# Patient Record
Sex: MALE | Race: WHITE | NOT HISPANIC OR LATINO | URBAN - METROPOLITAN AREA
[De-identification: names, ages, dates, MRNs, and addresses within clinical notes are randomized per-mention and may not be internally consistent; named-entity substitution may affect disease eponyms.]

---

## 2024-07-15 ENCOUNTER — HOSPITAL ENCOUNTER (EMERGENCY)
Facility: HOSPITAL | Age: 34
Discharge: LEFT BEFORE DISCHARGE | End: 2024-07-15

## 2024-07-15 VITALS
TEMPERATURE: 100 F | RESPIRATION RATE: 18 BRPM | WEIGHT: 250 LBS | OXYGEN SATURATION: 96 % | SYSTOLIC BLOOD PRESSURE: 151 MMHG | BODY MASS INDEX: 33.86 KG/M2 | HEART RATE: 103 BPM | DIASTOLIC BLOOD PRESSURE: 90 MMHG | HEIGHT: 72 IN

## 2024-07-15 PROCEDURE — 99999 HC NO LEVEL OF SERVICE - ED ONLY: CPT

## 2024-07-15 RX ORDER — KETOROLAC TROMETHAMINE 30 MG/ML
15 INJECTION, SOLUTION INTRAMUSCULAR; INTRAVENOUS
Status: DISCONTINUED | OUTPATIENT
Start: 2024-07-15 | End: 2024-07-15 | Stop reason: HOSPADM

## 2024-07-15 RX ORDER — ONDANSETRON HYDROCHLORIDE 2 MG/ML
4 INJECTION, SOLUTION INTRAVENOUS
Status: DISCONTINUED | OUTPATIENT
Start: 2024-07-15 | End: 2024-07-15 | Stop reason: HOSPADM

## 2024-07-16 NOTE — FIRST PROVIDER EVALUATION
Medical screening examination initiated.  I have conducted a focused provider triage encounter, findings are as follows:    Brief history of present illness:  33 y/o m with hx of kidney stones presenting with right flank pain that worsened this am. It has improved and increased. +nausea. +blood in urine. Feels like previous kidney stones.     +tobacco use    PMHX: Kidney stones  PSgHX: appy  SOCHx: +tobacco use, previous etoh abuse      Vitals:    07/15/24 1921   BP: (!) 151/90   Pulse: 103   Resp: 18   Temp: 99.5 °F (37.5 °C)   SpO2: 96%   Weight: 113.4 kg (250 lb)   Height: 6' (1.829 m)       Pertinent physical exam:  non-toxic    Brief workup plan:  flank pain, labs, UA, toradol and zofran.     Preliminary workup initiated; this workup will be continued and followed by the physician or advanced practice provider that is assigned to the patient when roomed.    Froylan Morris DO, FAAEM  Emergency Staff Physician   Dept of Emergency Medicine   [facility]  Spectralink: [phone]        Disclaimer: This note has been generated using voice-recognition software. There may be typographical errors that have been missed during proof-reading.

## 2024-10-14 ENCOUNTER — HOSPITAL ENCOUNTER (EMERGENCY)
Facility: HOSPITAL | Age: 34
Discharge: HOME | End: 2024-10-14
Attending: EMERGENCY MEDICINE

## 2024-10-14 VITALS
SYSTOLIC BLOOD PRESSURE: 127 MMHG | RESPIRATION RATE: 18 BRPM | TEMPERATURE: 97 F | HEIGHT: 72 IN | OXYGEN SATURATION: 98 % | DIASTOLIC BLOOD PRESSURE: 70 MMHG | HEART RATE: 76 BPM | WEIGHT: 250 LBS | BODY MASS INDEX: 33.86 KG/M2

## 2024-10-14 DIAGNOSIS — N20.0 RIGHT NEPHROLITHIASIS: ICD-10-CM

## 2024-10-14 DIAGNOSIS — N20.0 NEPHROLITHIASIS: Primary | ICD-10-CM

## 2024-10-14 LAB
ALBUMIN SERPL BCP-MCNC: 3.9 G/DL (ref 3.5–5.2)
ALP SERPL-CCNC: 72 U/L (ref 55–135)
ALT SERPL W/O P-5'-P-CCNC: 33 U/L (ref 10–44)
ANION GAP SERPL CALC-SCNC: 10 MMOL/L (ref 8–16)
AST SERPL-CCNC: 20 U/L (ref 10–40)
BACTERIA #/AREA URNS HPF: ABNORMAL /HPF
BASOPHILS # BLD AUTO: 0.06 K/UL (ref 0–0.2)
BASOPHILS NFR BLD: 0.5 % (ref 0–1.9)
BILIRUB SERPL-MCNC: 0.3 MG/DL (ref 0.1–1)
BILIRUB UR QL STRIP: NEGATIVE
BUN SERPL-MCNC: 13 MG/DL (ref 6–20)
CALCIUM SERPL-MCNC: 9.1 MG/DL (ref 8.7–10.5)
CHLORIDE SERPL-SCNC: 105 MMOL/L (ref 95–110)
CLARITY UR: CLEAR
CO2 SERPL-SCNC: 22 MMOL/L (ref 23–29)
COLOR UR: YELLOW
CREAT SERPL-MCNC: 0.9 MG/DL (ref 0.5–1.4)
DIFFERENTIAL METHOD BLD: ABNORMAL
EOSINOPHIL # BLD AUTO: 0.1 K/UL (ref 0–0.5)
EOSINOPHIL NFR BLD: 0.6 % (ref 0–8)
ERYTHROCYTE [DISTWIDTH] IN BLOOD BY AUTOMATED COUNT: 12.8 % (ref 11.5–14.5)
EST. GFR  (NO RACE VARIABLE): >60 ML/MIN/1.73 M^2
GLUCOSE SERPL-MCNC: 154 MG/DL (ref 70–110)
GLUCOSE UR QL STRIP: NEGATIVE
HCT VFR BLD AUTO: 44.7 % (ref 40–54)
HGB BLD-MCNC: 15.3 G/DL (ref 14–18)
HGB UR QL STRIP: ABNORMAL
HYALINE CASTS #/AREA URNS LPF: 0 /LPF
IMM GRANULOCYTES # BLD AUTO: 0.08 K/UL (ref 0–0.04)
IMM GRANULOCYTES NFR BLD AUTO: 0.6 % (ref 0–0.5)
KETONES UR QL STRIP: NEGATIVE
LEUKOCYTE ESTERASE UR QL STRIP: ABNORMAL
LIPASE SERPL-CCNC: 14 U/L (ref 4–60)
LYMPHOCYTES # BLD AUTO: 1.2 K/UL (ref 1–4.8)
LYMPHOCYTES NFR BLD: 9.7 % (ref 18–48)
MCH RBC QN AUTO: 29.5 PG (ref 27–31)
MCHC RBC AUTO-ENTMCNC: 34.2 G/DL (ref 32–36)
MCV RBC AUTO: 86 FL (ref 82–98)
MICROSCOPIC COMMENT: ABNORMAL
MONOCYTES # BLD AUTO: 0.6 K/UL (ref 0.3–1)
MONOCYTES NFR BLD: 4.5 % (ref 4–15)
NEUTROPHILS # BLD AUTO: 10.6 K/UL (ref 1.8–7.7)
NEUTROPHILS NFR BLD: 84.1 % (ref 38–73)
NITRITE UR QL STRIP: NEGATIVE
NRBC BLD-RTO: 0 /100 WBC
PH UR STRIP: 6 [PH] (ref 5–8)
PLATELET # BLD AUTO: 237 K/UL (ref 150–450)
PMV BLD AUTO: 10.3 FL (ref 9.2–12.9)
POTASSIUM SERPL-SCNC: 4.1 MMOL/L (ref 3.5–5.1)
PROT SERPL-MCNC: 7 G/DL (ref 6–8.4)
PROT UR QL STRIP: ABNORMAL
RBC # BLD AUTO: 5.18 M/UL (ref 4.6–6.2)
RBC #/AREA URNS HPF: 76 /HPF (ref 0–4)
SODIUM SERPL-SCNC: 137 MMOL/L (ref 136–145)
SP GR UR STRIP: 1.02 (ref 1–1.03)
SQUAMOUS #/AREA URNS HPF: 0 /HPF
URN SPEC COLLECT METH UR: ABNORMAL
UROBILINOGEN UR STRIP-ACNC: NEGATIVE EU/DL
WBC # BLD AUTO: 12.64 K/UL (ref 3.9–12.7)
WBC #/AREA URNS HPF: 19 /HPF (ref 0–5)

## 2024-10-14 PROCEDURE — 99285 EMERGENCY DEPT VISIT HI MDM: CPT | Mod: 25

## 2024-10-14 PROCEDURE — 96375 TX/PRO/DX INJ NEW DRUG ADDON: CPT

## 2024-10-14 PROCEDURE — 80053 COMPREHEN METABOLIC PANEL: CPT

## 2024-10-14 PROCEDURE — 85025 COMPLETE CBC W/AUTO DIFF WBC: CPT

## 2024-10-14 PROCEDURE — 63600175 PHARM REV CODE 636 W HCPCS

## 2024-10-14 PROCEDURE — 96374 THER/PROPH/DIAG INJ IV PUSH: CPT

## 2024-10-14 PROCEDURE — 87086 URINE CULTURE/COLONY COUNT: CPT

## 2024-10-14 PROCEDURE — 83690 ASSAY OF LIPASE: CPT

## 2024-10-14 PROCEDURE — 81000 URINALYSIS NONAUTO W/SCOPE: CPT

## 2024-10-14 RX ORDER — ONDANSETRON 4 MG/1
4 TABLET, ORALLY DISINTEGRATING ORAL 2 TIMES DAILY
Qty: 8 TABLET | Refills: 0 | Status: SHIPPED | OUTPATIENT
Start: 2024-10-14 | End: 2024-10-18

## 2024-10-14 RX ORDER — KETOROLAC TROMETHAMINE 30 MG/ML
15 INJECTION, SOLUTION INTRAMUSCULAR; INTRAVENOUS
Status: COMPLETED | OUTPATIENT
Start: 2024-10-14 | End: 2024-10-14

## 2024-10-14 RX ORDER — TAMSULOSIN HYDROCHLORIDE 0.4 MG/1
0.4 CAPSULE ORAL DAILY
Qty: 5 CAPSULE | Refills: 0 | Status: SHIPPED | OUTPATIENT
Start: 2024-10-14 | End: 2024-10-19

## 2024-10-14 RX ORDER — OXYCODONE AND ACETAMINOPHEN 10; 325 MG/1; MG/1
1 TABLET ORAL EVERY 6 HOURS PRN
Qty: 12 TABLET | Refills: 0 | Status: SHIPPED | OUTPATIENT
Start: 2024-10-14 | End: 2024-10-17

## 2024-10-14 RX ORDER — ONDANSETRON HYDROCHLORIDE 2 MG/ML
4 INJECTION, SOLUTION INTRAVENOUS
Status: COMPLETED | OUTPATIENT
Start: 2024-10-14 | End: 2024-10-14

## 2024-10-14 RX ADMIN — KETOROLAC TROMETHAMINE 15 MG: 30 INJECTION, SOLUTION INTRAMUSCULAR; INTRAVENOUS at 09:10

## 2024-10-14 RX ADMIN — ONDANSETRON 4 MG: 2 INJECTION INTRAMUSCULAR; INTRAVENOUS at 09:10

## 2024-10-14 NOTE — DISCHARGE INSTRUCTIONS
Thank you for letting me care for you today - it was nice to meet you and I hope you feel better soon. You will have a procedure with Dr. Tyson on Thursday 10/17/24. They will call you tomorrow with details. Their number is [phone] if you don't hear from them.     I prescribed the following medication:   Flomax: 1 capsule once a day  Percocet: 1 tablet every 6 hours for pain  Zofran: You can take this up to 2 times per day, as needed for nausea     Please avoid ibuprofen/advil/aleve.      Our goal at [facility] is to always give you outstanding care and exceptional service. You may receive a survey by mail or email in the next week about your experience in our ED. We would greatly appreciate you completing and returning the survey. Your feedback provides us with a way to recognize our staff who give very good care and it helps us learn how to improve when your experience was below our aspiration of excellence.     All the best,     Lizet Duenas, MPH, PA-C  Emergency Department Physician Assistant  [facility], [facility]

## 2024-10-14 NOTE — ED PROVIDER NOTES
Encounter Date: 10/14/2024       History     Chief Complaint   Patient presents with    Flank Pain     Patient reports to the ED complaining of right sided flank pain that started last week, stopped, and came back today. Patient endorses nausea, vomiting. Denies urinary symptoms. Ambulatory to triage, appears mildly distressed due to pain.     Patient is a 34 y.o. male who presents to the ED for evaluation of right flank pain X 1 week. States that the pain comes in waves. Patient has a hx of kidney stones and states this feels similar.     Fever and chills are not reported.  Patient denies gross hematuria. There is accompanying nausea without vomiting. Denies dysuria, urinary frequency, urinary urgency, difficulty urinating. Has not had any medication today for pain control.     Denies chest pain, shortness of breath, wheezing, stridor, drooling, joint problems, rashes, or any other complaints at this time.     The history is provided by the patient.     Review of patient's allergies indicates:  No Known Allergies  No past medical history on file.  No past surgical history on file.  No family history on file.     Review of Systems   Constitutional:  Negative for fever.   Gastrointestinal:  Positive for nausea. Negative for abdominal distention, abdominal pain and vomiting.   Genitourinary:  Positive for flank pain. Negative for decreased urine volume, difficulty urinating, dysuria, frequency, hematuria, penile discharge, scrotal swelling, testicular pain and urgency.       Physical Exam     Initial Vitals [10/14/24 0844]   BP Pulse Resp Temp SpO2   128/74 72 16 97.4 °F (36.3 °C) 98 %      MAP       --         Physical Exam    Constitutional: He appears well-developed and well-nourished.   HENT:   Head: Normocephalic and atraumatic.   Cardiovascular:  Normal rate.           Abdominal: He exhibits no distension. There is no abdominal tenderness.   There is right CVA tenderness.  No left CVA tenderness. There is no  rebound and no guarding.     Neurological: He is alert.         ED Course   Procedures  Labs Reviewed   URINALYSIS, REFLEX TO URINE CULTURE - Abnormal       Result Value    Specimen UA Urine, Clean Catch      Color, UA Yellow      Appearance, UA Clear      pH, UA 6.0      Specific Gravity, UA 1.020      Protein, UA 1+ (*)     Glucose, UA Negative      Ketones, UA Negative      Bilirubin (UA) Negative      Occult Blood UA 3+ (*)     Nitrite, UA Negative      Urobilinogen, UA Negative      Leukocytes, UA Trace (*)     Narrative:     Specimen Source->Urine   CBC W/ AUTO DIFFERENTIAL - Abnormal    WBC 12.64      RBC 5.18      Hemoglobin 15.3      Hematocrit 44.7      MCV 86      MCH 29.5      MCHC 34.2      RDW 12.8      Platelets 237      MPV 10.3      Immature Granulocytes 0.6 (*)     Gran # (ANC) 10.6 (*)     Immature Grans (Abs) 0.08 (*)     Lymph # 1.2      Mono # 0.6      Eos # 0.1      Baso # 0.06      nRBC 0      Gran % 84.1 (*)     Lymph % 9.7 (*)     Mono % 4.5      Eosinophil % 0.6      Basophil % 0.5      Differential Method Automated     COMPREHENSIVE METABOLIC PANEL - Abnormal    Sodium 137      Potassium 4.1      Chloride 105      CO2 22 (*)     Glucose 154 (*)     BUN 13      Creatinine 0.9      Calcium 9.1      Total Protein 7.0      Albumin 3.9      Total Bilirubin 0.3      Alkaline Phosphatase 72      AST 20      ALT 33      eGFR >60      Anion Gap 10     URINALYSIS MICROSCOPIC - Abnormal    RBC, UA 76 (*)     WBC, UA 19 (*)     Bacteria None      Squam Epithel, UA 0      Hyaline Casts, UA 0      Microscopic Comment SEE COMMENT      Narrative:     Specimen Source->Urine   CULTURE, URINE   LIPASE    Lipase 14            Imaging Results              CT Renal Stone Study ABD Pelvis WO (Final result)  Result time 10/14/24 10:40:07      Final result by Chris Garcia DO (10/14/24 10:40:07)                   Impression:      1.1 cm calculus in the right renal pelvis, with surrounding fat stranding in  the right renal hilum, compatible with overlying inflammation or infection.  No hydronephrosis.  Recommend correlation with urinalysis.    Additional nonobstructing left-sided nephrolithiasis.    Several hypodensities in the liver which are indeterminate on this study.  Recommend further characterization with nonemergent MRI with and without contrast.      Electronically signed by: Chris Garcia  Date:    10/14/2024  Time:    10:40               Narrative:    EXAMINATION:  CT RENAL STONE STUDY ABD PELVIS WO    CLINICAL HISTORY:  Flank pain, kidney stone suspected;    TECHNIQUE:  Multiplanar images were obtained of the abdomen and pelvis from the hemidiaphragms through the symphysis pubis without intravenous contrast.    COMPARISON:  None    FINDINGS:  Lung Bases: Clear.    Heart: Heart size is normal.  No pericardial effusion.    Liver: There are 3 hypodensities in the liver which are indeterminate.  The largest hypodensity is in the central liver and measures approximately 6.0 by 4.9 cm (series 2, image 16).  Right hepatic lobe hypodensity measures 3.4 x 1.5 cm (series 2, image 35).  Left hepatic lobe hypodensity measures 1.7 cm (series 2, image 20).    Biliary tract: No intrahepatic or extrahepatic biliary ductal dilatation.    Gallbladder: No radiodense gallstone. No wall thickening or pericholecystic fluid.    Pancreas: Normal. No pancreatic ductal dilatation.    Spleen: Normal size without focal lesion.    Adrenals: Normal.    Kidneys and urinary collecting systems: There is a 1.1 cm calculus in the right renal pelvis with surrounding fat stranding and inflammation.  There is a punctate nonobstructing left-sided nephrolith.  There is no hydronephrosis.    Lymph nodes: None enlarged.    Stomach and bowel: The stomach is normal.  Loops of small and large bowel are normal in caliber without evidence for inflammation or obstruction.  The appendix is not definitively seen.    Peritoneum and mesentery: No ascites or  free intraperitoneal air. No abdominal fluid collection.    Vasculature: Normal.    Urinary bladder: Normal.    Reproductive organs: The prostate is unremarkable.    Body wall: No abnormality.    Musculoskeletal: No aggressive osseous lesion.                                       Medications   ketorolac injection 15 mg (15 mg Intravenous Given 10/14/24 0921)   ondansetron injection 4 mg (4 mg Intravenous Given 10/14/24 0921)     Medical Decision Making  Patient is an afebrile, well-appearing 34 y.o. male who here with right flank pain X 1 week. Exam with right CVA tenderness. Patient is tolerating PO. VSS.     Differential diagnosis for urinary symptoms includes but is not limited to cystitis, pyelonephritis, ureterolithiasis, nephrolithiasis, chlamydia, gonorrhea, vaginal candidiasis, bacterial vaginosis, Trichomonas    All historical, clinical, radiographic, findings were reviewed with the patient in detail. UA with blood, trace leukocytes, nitrite negative.  UC sent.  No leukocytosis or fever, vital signs are not suggestive of sepsis.  Kidney function WNL.  CT indicative of 1.1 cm calculus in the right renal pelvis.  No hydronephrosis.    I had a discussion with the on-call urologist who believes that patient can be seen as an outpatient on Thursday of this week to be stented, as long as patient's pain is under control and that is he tolerating PO.     Placed an urgent referral to Urology and gave patient the contact information for the urologist.  Patient has been counseled regarding the need for follow-up as well as the indication to return to the emergency room should new or worrisome developments occur.  The patient verbalized an understanding. The patient is asked if there are any questions or concerns. Patient given discharge instructions. We discuss the case, until all issues are addressed to the patient's satisfaction. Patient understands and is agreeable to the plan.     Amount and/or Complexity of Data  Reviewed  Labs: ordered. Decision-making details documented in ED Course.  Radiology: ordered and independent interpretation performed.    Risk  Prescription drug management.               ED Course as of 10/15/24 1853   Mon Oct 14, 2024   0850 Patient examined and assessed. Patient answering questions appropriately, speaking in complete sentences, respirations are even and unlabored.  AAO x 4.  [OB]   0930 WBC: 12.64  No leukocytosis [OB]   0948 Leukocyte Esterase, UA(!): Trace [OB]   0948 NITRITE UA: Negative [OB]   0948 Blood, UA(!): 3+ [OB]   0948 RBC, UA(!): 76 [OB]   0948 WBC, UA(!): 19 [OB]   1054 CT renal reviewed: Impression:     1.1 cm calculus in the right renal pelvis, with surrounding fat stranding in the right renal hilum, compatible with overlying inflammation or infection.  No hydronephrosis.  Recommend correlation with urinalysis.     Additional nonobstructing left-sided nephrolithiasis. [OB]   1101 Speaking with Urology (Dr. Tyson). Recommends either admission today or outpatient procedure on Thursday.  [OB]   1147 Speaking with Uro. Patient prefers procedure on Thursday. Requests KUB before discharge.     Recommends d/c with flomax, perocet. States that abx not needed.  [OB]   1249 Discussed that patient needs a KUB, however patient states that his ride is here and he can not wait any longer.  IV removed and patient left ER. [OB]      ED Course User Index  [OB] Lizet Duenas PA-C                           Clinical Impression:  Final diagnoses:  [N20.0] Right nephrolithiasis          ED Disposition Condition    Discharge           ED Prescriptions       Medication Sig Dispense Start Date End Date Auth. Provider    ondansetron (ZOFRAN-ODT) 4 MG TbDL Take 1 tablet (4 mg total) by mouth 2 (two) times daily. for 4 days 8 tablet 10/14/2024 10/18/2024 Lizet Duenas PA-C    oxyCODONE-acetaminophen (PERCOCET)  mg per tablet Take 1 tablet by mouth every 6 (six) hours as needed for Pain. 12 tablet  10/14/2024 10/17/2024 Lizet Duenas PA-C    tamsulosin (FLOMAX) 0.4 mg Cap Take 1 capsule (0.4 mg total) by mouth once daily. for 5 days 5 capsule 10/14/2024 10/19/2024 Lizet Duenas PA-C          Follow-up Information    None          Lizet Duenas PA-C  10/15/24 [id number]

## 2024-10-14 NOTE — Clinical Note
"Cesar Diazrosaura Macedo was seen and treated in our emergency department on 10/14/2024.  He may return to work on 10/21/2024.  Patient needs to have a procedure to remove a kidney stone on 10/17/24.      If you have any questions or concerns, please don't hesitate to call.      Lizet Duenas PA-C"

## 2024-10-15 LAB — BACTERIA UR CULT: NORMAL

## 2024-11-14 ENCOUNTER — HOSPITAL ENCOUNTER (EMERGENCY)
Facility: HOSPITAL | Age: 34
Discharge: PSYCHIATRIC OR SUBSTANCE USE FACILITY | End: 2024-11-15
Attending: INTERNAL MEDICINE
Payer: MEDICAID

## 2024-11-14 VITALS
HEIGHT: 72 IN | OXYGEN SATURATION: 100 % | SYSTOLIC BLOOD PRESSURE: 124 MMHG | BODY MASS INDEX: 33.86 KG/M2 | HEART RATE: 109 BPM | TEMPERATURE: 98 F | RESPIRATION RATE: 18 BRPM | WEIGHT: 250 LBS | DIASTOLIC BLOOD PRESSURE: 75 MMHG

## 2024-11-14 DIAGNOSIS — N30.01 ACUTE CYSTITIS WITH HEMATURIA: ICD-10-CM

## 2024-11-14 DIAGNOSIS — F23 ACUTE PSYCHOSIS: Primary | ICD-10-CM

## 2024-11-14 DIAGNOSIS — F19.10 SUBSTANCE ABUSE: ICD-10-CM

## 2024-11-14 LAB
ALBUMIN SERPL-MCNC: 4.7 G/DL (ref 3.5–5)
ALBUMIN/GLOB SERPL: 1.6 RATIO (ref 1.1–2)
ALP SERPL-CCNC: 81 UNIT/L (ref 40–150)
ALT SERPL-CCNC: 23 UNIT/L (ref 0–55)
AMPHET UR QL SCN: POSITIVE
ANION GAP SERPL CALC-SCNC: 10 MEQ/L
APAP SERPL-MCNC: <3 UG/ML (ref 10–30)
AST SERPL-CCNC: 22 UNIT/L (ref 5–34)
BACTERIA #/AREA URNS AUTO: ABNORMAL /HPF
BARBITURATE SCN PRESENT UR: NEGATIVE
BASOPHILS # BLD AUTO: 0.06 X10(3)/MCL
BASOPHILS NFR BLD AUTO: 0.6 %
BENZODIAZ UR QL SCN: NEGATIVE
BILIRUB SERPL-MCNC: 0.9 MG/DL
BILIRUB UR QL STRIP.AUTO: NEGATIVE
BUN SERPL-MCNC: 17.3 MG/DL (ref 8.9–20.6)
CALCIUM SERPL-MCNC: 9.8 MG/DL (ref 8.4–10.2)
CANNABINOIDS UR QL SCN: POSITIVE
CHLORIDE SERPL-SCNC: 108 MMOL/L (ref 98–107)
CLARITY UR: ABNORMAL
CO2 SERPL-SCNC: 22 MMOL/L (ref 22–29)
COCAINE UR QL SCN: NEGATIVE
COLOR UR AUTO: YELLOW
CREAT SERPL-MCNC: 1.01 MG/DL (ref 0.72–1.25)
CREAT/UREA NIT SERPL: 17
EOSINOPHIL # BLD AUTO: 0.08 X10(3)/MCL (ref 0–0.9)
EOSINOPHIL NFR BLD AUTO: 0.8 %
ERYTHROCYTE [DISTWIDTH] IN BLOOD BY AUTOMATED COUNT: 12.5 % (ref 11.5–17)
ETHANOL SERPL-MCNC: <10 MG/DL
FENTANYL UR QL SCN: NEGATIVE
GFR SERPLBLD CREATININE-BSD FMLA CKD-EPI: >60 ML/MIN/1.73/M2
GLOBULIN SER-MCNC: 3 GM/DL (ref 2.4–3.5)
GLUCOSE SERPL-MCNC: 112 MG/DL (ref 74–100)
GLUCOSE UR QL STRIP: NORMAL
HCT VFR BLD AUTO: 44.4 % (ref 42–52)
HGB BLD-MCNC: 15.6 G/DL (ref 14–18)
HGB UR QL STRIP: ABNORMAL
HYALINE CASTS #/AREA URNS LPF: ABNORMAL /LPF
IMM GRANULOCYTES # BLD AUTO: 0.03 X10(3)/MCL (ref 0–0.04)
IMM GRANULOCYTES NFR BLD AUTO: 0.3 %
KETONES UR QL STRIP: ABNORMAL
LEUKOCYTE ESTERASE UR QL STRIP: 75
LYMPHOCYTES # BLD AUTO: 2 X10(3)/MCL (ref 0.6–4.6)
LYMPHOCYTES NFR BLD AUTO: 20.2 %
MCH RBC QN AUTO: 29.8 PG (ref 27–31)
MCHC RBC AUTO-ENTMCNC: 35.1 G/DL (ref 33–36)
MCV RBC AUTO: 84.9 FL (ref 80–94)
MDMA UR QL SCN: NEGATIVE
MONOCYTES # BLD AUTO: 0.66 X10(3)/MCL (ref 0.1–1.3)
MONOCYTES NFR BLD AUTO: 6.7 %
MUCOUS THREADS URNS QL MICRO: ABNORMAL /LPF
NEUTROPHILS # BLD AUTO: 7.09 X10(3)/MCL (ref 2.1–9.2)
NEUTROPHILS NFR BLD AUTO: 71.4 %
NITRITE UR QL STRIP: NEGATIVE
NRBC BLD AUTO-RTO: 0 %
OPIATES UR QL SCN: NEGATIVE
PCP UR QL: NEGATIVE
PH UR STRIP: 6 [PH]
PH UR: 6 [PH] (ref 3–11)
PLATELET # BLD AUTO: 265 X10(3)/MCL (ref 130–400)
PMV BLD AUTO: 10.4 FL (ref 7.4–10.4)
POTASSIUM SERPL-SCNC: 3.7 MMOL/L (ref 3.5–5.1)
PROT SERPL-MCNC: 7.7 GM/DL (ref 6.4–8.3)
PROT UR QL STRIP: ABNORMAL
RBC # BLD AUTO: 5.23 X10(6)/MCL (ref 4.7–6.1)
RBC #/AREA URNS AUTO: >100 /HPF
SODIUM SERPL-SCNC: 140 MMOL/L (ref 136–145)
SP GR UR STRIP.AUTO: 1.04 (ref 1–1.03)
SPECIFIC GRAVITY, URINE AUTO (.000) (OHS): 1.04 (ref 1–1.03)
SPERM URNS QL MICRO: ABNORMAL /HPF
SQUAMOUS #/AREA URNS LPF: ABNORMAL /HPF
TSH SERPL-ACNC: 1.78 UIU/ML (ref 0.35–4.94)
UROBILINOGEN UR STRIP-ACNC: 2
WBC # BLD AUTO: 9.92 X10(3)/MCL (ref 4.5–11.5)
WBC #/AREA URNS AUTO: ABNORMAL /HPF

## 2024-11-14 PROCEDURE — 99285 EMERGENCY DEPT VISIT HI MDM: CPT

## 2024-11-14 PROCEDURE — 80143 DRUG ASSAY ACETAMINOPHEN: CPT | Performed by: INTERNAL MEDICINE

## 2024-11-14 PROCEDURE — 81001 URINALYSIS AUTO W/SCOPE: CPT | Mod: XB | Performed by: INTERNAL MEDICINE

## 2024-11-14 PROCEDURE — 85025 COMPLETE CBC W/AUTO DIFF WBC: CPT | Performed by: INTERNAL MEDICINE

## 2024-11-14 PROCEDURE — 80053 COMPREHEN METABOLIC PANEL: CPT | Performed by: INTERNAL MEDICINE

## 2024-11-14 PROCEDURE — 25000003 PHARM REV CODE 250: Performed by: INTERNAL MEDICINE

## 2024-11-14 PROCEDURE — 87086 URINE CULTURE/COLONY COUNT: CPT | Performed by: INTERNAL MEDICINE

## 2024-11-14 PROCEDURE — 84443 ASSAY THYROID STIM HORMONE: CPT | Performed by: INTERNAL MEDICINE

## 2024-11-14 PROCEDURE — 82077 ASSAY SPEC XCP UR&BREATH IA: CPT | Performed by: INTERNAL MEDICINE

## 2024-11-14 PROCEDURE — 80307 DRUG TEST PRSMV CHEM ANLYZR: CPT | Performed by: INTERNAL MEDICINE

## 2024-11-14 RX ORDER — LORAZEPAM 1 MG/1
2 TABLET ORAL
Status: COMPLETED | OUTPATIENT
Start: 2024-11-14 | End: 2024-11-14

## 2024-11-14 RX ORDER — AMLODIPINE BESYLATE 5 MG/1
5 TABLET ORAL
Status: COMPLETED | OUTPATIENT
Start: 2024-11-14 | End: 2024-11-14

## 2024-11-14 RX ORDER — CEPHALEXIN 500 MG/1
500 CAPSULE ORAL 4 TIMES DAILY
Qty: 20 CAPSULE | Refills: 0 | Status: ON HOLD | OUTPATIENT
Start: 2024-11-14 | End: 2024-11-21 | Stop reason: HOSPADM

## 2024-11-14 RX ORDER — CEPHALEXIN 500 MG/1
500 CAPSULE ORAL EVERY 8 HOURS
Status: DISCONTINUED | OUTPATIENT
Start: 2024-11-14 | End: 2024-11-15 | Stop reason: HOSPADM

## 2024-11-14 RX ADMIN — AMLODIPINE BESYLATE 5 MG: 5 TABLET ORAL at 08:11

## 2024-11-14 RX ADMIN — LORAZEPAM 2 MG: 1 TABLET ORAL at 06:11

## 2024-11-14 RX ADMIN — CEPHALEXIN 500 MG: 500 CAPSULE ORAL at 08:11

## 2024-11-15 ENCOUNTER — HOSPITAL ENCOUNTER (INPATIENT)
Facility: HOSPITAL | Age: 34
LOS: 8 days | Discharge: HOME | DRG: 885 | End: 2024-11-23
Attending: PSYCHIATRY & NEUROLOGY | Admitting: PSYCHIATRY & NEUROLOGY
Payer: MEDICAID

## 2024-11-15 DIAGNOSIS — F29 PSYCHOSIS: ICD-10-CM

## 2024-11-15 LAB
ALBUMIN SERPL-MCNC: 4.1 G/DL (ref 3.5–5)
ALBUMIN/GLOB SERPL: 1.5 RATIO (ref 1.1–2)
ALP SERPL-CCNC: 74 UNIT/L (ref 40–150)
ALT SERPL-CCNC: 21 UNIT/L (ref 0–55)
ANION GAP SERPL CALC-SCNC: 8 MEQ/L
AST SERPL-CCNC: 22 UNIT/L (ref 5–34)
BASOPHILS # BLD AUTO: 0.06 X10(3)/MCL
BASOPHILS NFR BLD AUTO: 0.7 %
BILIRUB SERPL-MCNC: 0.6 MG/DL
BUN SERPL-MCNC: 18.3 MG/DL (ref 8.9–20.6)
CALCIUM SERPL-MCNC: 9.3 MG/DL (ref 8.4–10.2)
CHLORIDE SERPL-SCNC: 105 MMOL/L (ref 98–107)
CHOLEST SERPL-MCNC: 143 MG/DL
CHOLEST/HDLC SERPL: 4 {RATIO} (ref 0–5)
CO2 SERPL-SCNC: 30 MMOL/L (ref 22–29)
CREAT SERPL-MCNC: 0.89 MG/DL (ref 0.72–1.25)
CREAT/UREA NIT SERPL: 21
EOSINOPHIL # BLD AUTO: 0.14 X10(3)/MCL (ref 0–0.9)
EOSINOPHIL NFR BLD AUTO: 1.5 %
ERYTHROCYTE [DISTWIDTH] IN BLOOD BY AUTOMATED COUNT: 12.4 % (ref 11.5–17)
EST. AVERAGE GLUCOSE BLD GHB EST-MCNC: 102.5 MG/DL
GFR SERPLBLD CREATININE-BSD FMLA CKD-EPI: >60 ML/MIN/1.73/M2
GLOBULIN SER-MCNC: 2.8 GM/DL (ref 2.4–3.5)
GLUCOSE SERPL-MCNC: 81 MG/DL (ref 74–100)
HBA1C MFR BLD: 5.2 %
HCT VFR BLD AUTO: 45.1 % (ref 42–52)
HDLC SERPL-MCNC: 40 MG/DL (ref 35–60)
HGB BLD-MCNC: 15.2 G/DL (ref 14–18)
IMM GRANULOCYTES # BLD AUTO: 0.03 X10(3)/MCL (ref 0–0.04)
IMM GRANULOCYTES NFR BLD AUTO: 0.3 %
LDLC SERPL CALC-MCNC: 82 MG/DL (ref 50–140)
LYMPHOCYTES # BLD AUTO: 2.61 X10(3)/MCL (ref 0.6–4.6)
LYMPHOCYTES NFR BLD AUTO: 28.5 %
MCH RBC QN AUTO: 29.7 PG (ref 27–31)
MCHC RBC AUTO-ENTMCNC: 33.7 G/DL (ref 33–36)
MCV RBC AUTO: 88.3 FL (ref 80–94)
MONOCYTES # BLD AUTO: 0.85 X10(3)/MCL (ref 0.1–1.3)
MONOCYTES NFR BLD AUTO: 9.3 %
NEUTROPHILS # BLD AUTO: 5.47 X10(3)/MCL (ref 2.1–9.2)
NEUTROPHILS NFR BLD AUTO: 59.7 %
NRBC BLD AUTO-RTO: 0 %
PLATELET # BLD AUTO: 246 X10(3)/MCL (ref 130–400)
PMV BLD AUTO: 10.8 FL (ref 7.4–10.4)
POTASSIUM SERPL-SCNC: 4.3 MMOL/L (ref 3.5–5.1)
PROT SERPL-MCNC: 6.9 GM/DL (ref 6.4–8.3)
RBC # BLD AUTO: 5.11 X10(6)/MCL (ref 4.7–6.1)
SODIUM SERPL-SCNC: 143 MMOL/L (ref 136–145)
T PALLIDUM AB SER QL: NONREACTIVE
TRIGL SERPL-MCNC: 103 MG/DL (ref 34–140)
TSH SERPL-ACNC: 2.18 UIU/ML (ref 0.35–4.94)
VLDLC SERPL CALC-MCNC: 21 MG/DL
WBC # BLD AUTO: 9.16 X10(3)/MCL (ref 4.5–11.5)

## 2024-11-15 PROCEDURE — 86780 TREPONEMA PALLIDUM: CPT | Performed by: PSYCHIATRY & NEUROLOGY

## 2024-11-15 PROCEDURE — 80053 COMPREHEN METABOLIC PANEL: CPT | Performed by: PSYCHIATRY & NEUROLOGY

## 2024-11-15 PROCEDURE — 25000003 PHARM REV CODE 250: Performed by: PSYCHIATRY & NEUROLOGY

## 2024-11-15 PROCEDURE — 85025 COMPLETE CBC W/AUTO DIFF WBC: CPT | Performed by: PSYCHIATRY & NEUROLOGY

## 2024-11-15 PROCEDURE — 25000003 PHARM REV CODE 250

## 2024-11-15 PROCEDURE — 36415 COLL VENOUS BLD VENIPUNCTURE: CPT | Performed by: PSYCHIATRY & NEUROLOGY

## 2024-11-15 PROCEDURE — 12400001 HC PSYCH SEMI-PRIVATE ROOM

## 2024-11-15 PROCEDURE — 84443 ASSAY THYROID STIM HORMONE: CPT | Performed by: PSYCHIATRY & NEUROLOGY

## 2024-11-15 PROCEDURE — 25000003 PHARM REV CODE 250: Performed by: PEDIATRICS

## 2024-11-15 PROCEDURE — 80061 LIPID PANEL: CPT | Performed by: PSYCHIATRY & NEUROLOGY

## 2024-11-15 PROCEDURE — S4991 NICOTINE PATCH NONLEGEND: HCPCS | Performed by: PSYCHIATRY & NEUROLOGY

## 2024-11-15 PROCEDURE — 83036 HEMOGLOBIN GLYCOSYLATED A1C: CPT | Performed by: PSYCHIATRY & NEUROLOGY

## 2024-11-15 RX ORDER — CEPHALEXIN 500 MG/1
500 CAPSULE ORAL 4 TIMES DAILY
Status: COMPLETED | OUTPATIENT
Start: 2024-11-15 | End: 2024-11-18

## 2024-11-15 RX ORDER — ACETAMINOPHEN 325 MG/1
650 TABLET ORAL EVERY 6 HOURS PRN
Status: DISCONTINUED | OUTPATIENT
Start: 2024-11-16 | End: 2024-11-23 | Stop reason: HOSPADM

## 2024-11-15 RX ORDER — SERTRALINE HYDROCHLORIDE 50 MG/1
50 TABLET, FILM COATED ORAL DAILY
Status: DISCONTINUED | OUTPATIENT
Start: 2024-11-15 | End: 2024-11-18

## 2024-11-15 RX ORDER — CLONIDINE HYDROCHLORIDE 0.1 MG/1
0.2 TABLET ORAL EVERY 8 HOURS PRN
Status: DISCONTINUED | OUTPATIENT
Start: 2024-11-15 | End: 2024-11-23 | Stop reason: HOSPADM

## 2024-11-15 RX ORDER — HALOPERIDOL 5 MG/ML
10 INJECTION INTRAMUSCULAR EVERY 4 HOURS PRN
Status: DISCONTINUED | OUTPATIENT
Start: 2024-11-16 | End: 2024-11-23 | Stop reason: HOSPADM

## 2024-11-15 RX ORDER — DIPHENHYDRAMINE HCL 50 MG
50 CAPSULE ORAL EVERY 4 HOURS PRN
Status: DISCONTINUED | OUTPATIENT
Start: 2024-11-16 | End: 2024-11-23 | Stop reason: HOSPADM

## 2024-11-15 RX ORDER — LORAZEPAM 0.5 MG/1
0.5 TABLET ORAL 3 TIMES DAILY
Status: COMPLETED | OUTPATIENT
Start: 2024-11-19 | End: 2024-11-20

## 2024-11-15 RX ORDER — DIPHENHYDRAMINE HYDROCHLORIDE 50 MG/ML
50 INJECTION INTRAMUSCULAR; INTRAVENOUS EVERY 4 HOURS PRN
Status: DISCONTINUED | OUTPATIENT
Start: 2024-11-16 | End: 2024-11-23 | Stop reason: HOSPADM

## 2024-11-15 RX ORDER — LORAZEPAM 2 MG/ML
2 INJECTION INTRAMUSCULAR EVERY 4 HOURS PRN
Status: DISCONTINUED | OUTPATIENT
Start: 2024-11-16 | End: 2024-11-23 | Stop reason: HOSPADM

## 2024-11-15 RX ORDER — IBUPROFEN 200 MG
1 TABLET ORAL DAILY
Status: DISCONTINUED | OUTPATIENT
Start: 2024-11-15 | End: 2024-11-23 | Stop reason: HOSPADM

## 2024-11-15 RX ORDER — LORAZEPAM 1 MG/1
2 TABLET ORAL EVERY 4 HOURS PRN
Status: DISCONTINUED | OUTPATIENT
Start: 2024-11-16 | End: 2024-11-23 | Stop reason: HOSPADM

## 2024-11-15 RX ORDER — ALUMINUM HYDROXIDE, MAGNESIUM HYDROXIDE, AND SIMETHICONE 1200; 120; 1200 MG/30ML; MG/30ML; MG/30ML
30 SUSPENSION ORAL EVERY 6 HOURS PRN
Status: DISCONTINUED | OUTPATIENT
Start: 2024-11-16 | End: 2024-11-23 | Stop reason: HOSPADM

## 2024-11-15 RX ORDER — TAMSULOSIN HYDROCHLORIDE 0.4 MG/1
0.4 CAPSULE ORAL DAILY
Status: DISCONTINUED | OUTPATIENT
Start: 2024-11-15 | End: 2024-11-23 | Stop reason: HOSPADM

## 2024-11-15 RX ORDER — LORAZEPAM 1 MG/1
1 TABLET ORAL 3 TIMES DAILY
Status: COMPLETED | OUTPATIENT
Start: 2024-11-17 | End: 2024-11-18

## 2024-11-15 RX ORDER — HALOPERIDOL 5 MG/1
10 TABLET ORAL EVERY 4 HOURS PRN
Status: DISCONTINUED | OUTPATIENT
Start: 2024-11-16 | End: 2024-11-23 | Stop reason: HOSPADM

## 2024-11-15 RX ORDER — HYDROXYZINE HYDROCHLORIDE 50 MG/1
50 TABLET, FILM COATED ORAL EVERY 4 HOURS PRN
Status: DISCONTINUED | OUTPATIENT
Start: 2024-11-15 | End: 2024-11-23 | Stop reason: HOSPADM

## 2024-11-15 RX ORDER — LORAZEPAM 1 MG/1
2 TABLET ORAL 3 TIMES DAILY
Status: DISPENSED | OUTPATIENT
Start: 2024-11-15 | End: 2024-11-17

## 2024-11-15 RX ORDER — CLONIDINE HYDROCHLORIDE 0.1 MG/1
0.1 TABLET ORAL EVERY 8 HOURS PRN
Status: DISCONTINUED | OUTPATIENT
Start: 2024-11-15 | End: 2024-11-23 | Stop reason: HOSPADM

## 2024-11-15 RX ORDER — TRAZODONE HYDROCHLORIDE 100 MG/1
100 TABLET ORAL NIGHTLY PRN
Status: DISCONTINUED | OUTPATIENT
Start: 2024-11-15 | End: 2024-11-23 | Stop reason: HOSPADM

## 2024-11-15 RX ADMIN — TAMSULOSIN HYDROCHLORIDE 0.4 MG: 0.4 CAPSULE ORAL at 01:11

## 2024-11-15 RX ADMIN — TRAZODONE HYDROCHLORIDE 100 MG: 100 TABLET ORAL at 08:11

## 2024-11-15 RX ADMIN — SERTRALINE HYDROCHLORIDE 50 MG: 50 TABLET ORAL at 10:11

## 2024-11-15 RX ADMIN — LORAZEPAM 2 MG: 1 TABLET ORAL at 10:11

## 2024-11-15 RX ADMIN — NICOTINE 1 PATCH: 21 PATCH, EXTENDED RELEASE TRANSDERMAL at 09:11

## 2024-11-15 RX ADMIN — CEPHALEXIN 500 MG: 500 CAPSULE ORAL at 08:11

## 2024-11-15 RX ADMIN — LORAZEPAM 2 MG: 1 TABLET ORAL at 08:11

## 2024-11-15 RX ADMIN — HYDROXYZINE HYDROCHLORIDE 50 MG: 50 TABLET, FILM COATED ORAL at 08:11

## 2024-11-15 RX ADMIN — CEPHALEXIN 500 MG: 500 CAPSULE ORAL at 04:11

## 2024-11-15 RX ADMIN — CEPHALEXIN 500 MG: 500 CAPSULE ORAL at 01:11

## 2024-11-15 NOTE — PLAN OF CARE
Problem: Adult Behavioral Health Plan of Care  Goal: Plan of Care Review  Outcome: Not Progressing  Flowsheets (Taken 11/15/2024 0922)  Patient Agreement with Plan of Care: agrees  Plan of Care Reviewed With: patient  Goal: Patient-Specific Goal (Individualization)  Outcome: Not Progressing  Flowsheets (Taken 11/15/2024 0922)  Patient Personal Strengths: independent living skills  Patient Vulnerabilities: substance abuse/addiction  Goal: Adheres to Safety Considerations for Self and Others  Outcome: Not Progressing  Flowsheets (Taken 11/15/2024 0922)  Adheres to Safety Considerations for Self and Others: unable to achieve outcome  Intervention: Develop and Maintain Individualized Safety Plan  Flowsheets (Taken 11/15/2024 0922)  Safety Measures: safety rounds completed  Goal: Absence of New-Onset Illness or Injury  Outcome: Not Progressing  Intervention: Identify and Manage Fall Risk  Flowsheets (Taken 11/15/2024 0922)  Safety Measures: safety rounds completed  Intervention: Prevent VTE (Venous Thromboembolism)  Flowsheets (Taken 11/15/2024 0922)  VTE Prevention/Management: fluids promoted  Intervention: Prevent Infection  Flowsheets (Taken 11/15/2024 0922)  Infection Prevention: hand hygiene promoted  Goal: Optimized Coping Skills in Response to Life Stressors  Outcome: Not Progressing  Flowsheets (Taken 11/15/2024 0922)  Optimized Coping Skills in Response to Life Stressors: unable to achieve outcome  Intervention: Promote Effective Coping Strategies  Flowsheets (Taken 11/15/2024 0922)  Supportive Measures: self-care encouraged  Goal: Develops/Participates in Therapeutic [place] to Support Successful Transition  Outcome: Not Progressing  Flowsheets (Taken 11/15/2024 0922)  Develops/Participates in Therapeutic [place] to Support Successful Transition: unable to achieve outcome  Intervention: Foster Therapeutic [place]  Flowsheets (Taken 11/15/2024 0922)  Trust Relationship/Rapport: care explained  Goal:  Rounds/Family Conference  Outcome: Not Progressing  Flowsheets (Taken 11/15/2024 0922)  Participants:   milieu/psych techs   nursing     Problem: Psychotic Signs/Symptoms  Goal: Improved Behavioral Control (Psychotic Signs/Symptoms)  Outcome: Not Progressing  Flowsheets (Taken 11/15/2024 0922)  Mutually Determined Action Steps (Improved Behavioral Control): identifies symptoms triggers  Intervention: Manage Behavior  Flowsheets (Taken 11/15/2024 0922)  De-Escalation Techniques: quiet time facilitated  Goal: Optimal Cognitive Function (Psychotic Signs/Symptoms)  Outcome: Not Progressing  Flowsheets (Taken 11/15/2024 0922)  Mutually Determined Action Steps (Optimal Cognitive Function): participates in attention training  Intervention: Support and Promote Cognitive Ability  Flowsheets (Taken 11/15/2024 0922)  Trust Relationship/Rapport: care explained  Goal: Increased Participation and Engagement (Psychotic Signs/Symptoms)  Outcome: Not Progressing  Flowsheets (Taken 11/15/2024 0922)  Mutually Determined Action Steps (Increased Participation and Engagement): identifies symptoms triggers  Intervention: Facilitate Participation and Engagement  Flowsheets (Taken 11/15/2024 0922)  Supportive Measures: self-care encouraged  Diversional Activity: television  Goal: Improved Mood Symptoms (Psychotic Signs/Symptoms)  Outcome: Not Progressing  Flowsheets (Taken 11/15/2024 0922)  Mutually Determined Action Steps (Improved Mood Symptoms): acknowledges progress  Intervention: Optimize Emotion and Mood  Flowsheets (Taken 11/15/2024 0922)  Supportive Measures: self-care encouraged  Diversional Activity: television  Goal: Improved Psychomotor Symptoms (Psychotic Signs/Symptoms)  Outcome: Not Progressing  Flowsheets (Taken 11/15/2024 0922)  Mutually Determined Action Steps (Improved Psychomotor Symptoms): exhibits decrease in agitation  Intervention: Manage Psychomotor Movement  Flowsheets (Taken 11/15/2024 0922)  Activity (Behavioral  Health): up ad tirso  Patient Performed Hygiene: teeth brushed  Diversional Activity: television  Goal: Decreased Sensory Symptoms (Psychotic Signs/Symptoms)  Outcome: Not Progressing  Flowsheets (Taken 11/15/2024 0922)  Mutually Determined Action Steps (Decreased Sensory Symptoms): shares insight re: need for meds  Intervention: Minimize and Manage Sensory Impairment  Flowsheets (Taken 11/15/2024 0922)  Sensory Stimulation Regulation: quiet environment promoted  Goal: Improved Sleep (Psychotic Signs/Symptoms)  Outcome: Not Progressing  Flowsheets (Taken 11/15/2024 0922)  Mutually Determined Action Steps (Improved Sleep): sleeps 4-6 hours at night  Intervention: Promote Healthy Sleep Hygiene  Flowsheets (Taken 11/15/2024 0922)  Sleep Hygiene Promotion: regular sleep pattern promoted  Goal: Enhanced Social, Occupational or Functional Skills (Psychotic Signs/Symptoms)  Outcome: Not Progressing  Flowsheets (Taken 11/15/2024 0922)  Mutually Determined Action Steps (Enhanced Social, Occupational or Functional Skills): participates in social skills training  Intervention: Promote Social, Occupational and Functional Ability  Flowsheets (Taken 11/15/2024 0922)  Trust Relationship/Rapport: care explained  Social Functional Ability Promotion: autonomy promoted     Problem: Excessive Substance Use  Goal: Optimized Energy Level (Excessive Substance Use)  Outcome: Not Progressing  Flowsheets (Taken 11/15/2024 0922)  Mutually Determined Action Steps (Optimized Energy Level): grooms self without prompting  Intervention: Optimize Energy Level  Flowsheets (Taken 11/15/2024 0922)  Activity (Behavioral Health): up ad tirso  Patient Performed Hygiene: teeth brushed  Diversional Activity: television  Goal: Improved Behavioral Control (Excessive Substance Use)  Outcome: Not Progressing  Flowsheets (Taken 11/15/2024 0922)  Mutually Determined Action Steps (Improved Behavioral Control): identifies major stressors  Intervention: Promote  Behavior and Impulse Control  Flowsheets (Taken 11/15/2024 0922)  Behavior Management: behavioral plan reviewed  Goal: Increased Participation and Engagement (Excessive Substance Use)  Outcome: Not Progressing  Flowsheets (Taken 11/15/2024 0922)  Mutually Determined Action Steps (Increased Participation and Engagement): discusses ongoing recovery plan  Intervention: Facilitate Participation and Engagement  Flowsheets (Taken 11/15/2024 0922)  Supportive Measures: self-care encouraged  Diversional Activity: television  Goal: Improved Physiologic Symptoms (Excessive Substance Use)  Outcome: Not Progressing  Flowsheets (Taken 11/15/2024 0922)  Mutually Determined Action Steps (Improved Physiologic Symptoms): discusses use pattern  Intervention: Optimize Physiologic Function  Flowsheets (Taken 11/15/2024 0922)  Oral Nutrition Promotion: rest periods promoted  Nutrition Interventions: food preferences provided  Goal: Enhanced Social, Occupational or Functional Skills (Excessive Substance Use)  Outcome: Not Progressing  Flowsheets (Taken 11/15/2024 0922)  Mutually Determined Action Steps (Enhanced Social, Occupational or Functional Skills): participates in social skills training  Intervention: Promote Social, Occupational and Functional Ability  Flowsheets (Taken 11/15/2024 0922)  Trust Relationship/Rapport: care explained  Social Functional Ability Promotion: autonomy promoted    He is AAO X 4. Endorses depression and anxiety this AM. Denies SI, HI, hallucinations, and delusions. He states his hallucinations and delusions are due to the abuse of drugs. Good eye contact noted. Speech normal tone and speed. Minimal interactions noted with other patients and staff. He will be started on a detox protocol. Continue plan of care and provide a safe and therapeutic environment. Continue to monitor every fifteen minutes for safety.

## 2024-11-15 NOTE — PROGRESS NOTES
Cesar is a 35y/o male admitted for MOOD DISORDERS; Depressive Disorder NOS (F32.9), and ANXIETY DISORDERS; 7.8.Acute Stress Disorder (F43.9) SUBSTANCE-RELATED DISORDERS; Alcohol-Related Disorders; Alcohol Abuse Without Physiological Dependence , Amphetamine Related Disorders; Amphetamine Abuse , and Cannabis-Related Disorders; Cannabis Abuse (F12.10). Labs are positive for cannabinoids and amphetamines with a <10 ethanol. STRS met with pt 1:1, Cesar appears restless, but was cooperative. Cesar reported ability to perform his ADL's. STRS educated pt on TR group times and dates with Cesar open to attending TR groups. Cesar reported his treatment goal is anger managment.      11/15/24 1002   General   Admit Date 11/15/24   Primary Diagnosis MOOD DISORDERS; Depressive Disorder NOS (F32.9), and ANXIETY DISORDERS; 7.8.Acute Stress Disorder (F43.9)   Secondary Diagnosis SUBSTANCE-RELATED DISORDERS; Alcohol-Related Disorders; Alcohol Abuse Without Physiological Dependence , Amphetamine Related Disorders; Amphetamine Abuse , and Cannabis-Related Disorders; Cannabis Abuse (F12.10)   Jehovah's witness spiritual   Number of Children 2   Children Living? 2   Occupation lanscaping   Does the patient have dentures? No   If you were to take part in activities, which of the following would you prefer? Both   Do you feel like you have enough to keep you busy now? Yes   Do you believe that you have the opportunity for physical activity? Yes   Activity Capabilities Moderate   Subjective   Patient states another manic and parinoid eposide   Assessment   Mobility ambulates independently   Transfers independently   Musculoskeletal   (L foot swollen)   Visual Acuity normal vision   Visual Perception depth perception;color perception;recognizes letters;recognizes numbers   Hearing normal   Speech/Communication normal   Cognitive Concerns attention span;concentration   Social/Group Activities   Amish/Mandaen Past Interest   Parties/Seasonal  Program Current Interest   Shopping Current Interest   Volunteering Current Interest   Restaurant Current Interest   Solitary Activities   Watching TV Current Interest   Watching Videos Current Interest   Music Listening Current Interest   Reading Current Interest   Physical Activities   Baseball/Softball Current Interest   Track/Field Current Interest   Billiards/Pool Current Interest   [place] Current Interest   Fitness/Exercise Programs Current Interest   Basketball Current Interest   Walk/Run Current Interest   Creative Activities   Painting Current Interest   Playing Musical Instru Current Interest   Cooking Current Interest   Outdoor Activities   Hunting Current Interest   Fishing Current Interest   Gardening Current Interest   Camping Current Interest   Water Sports Current Interest   Horseback Riding Current Interest   Hiking Current Interest   Spectator Events   Concerts Current Interest   Plays Current Interest   Movies Current Interest   Sporting Events Current Interest   Passive Games   Bingo Current Interest   Card Games Current Interest   Goals   Goal Formulation With patient   Time For Goal Achievement 7 days   Goal 1 anger management   Goal 1-Progress progress-ongoing   Plan   Planned Therapy Intervention Group Recreational Therapy   Expected Length of Stay 5-7 days   PT Frequency Minimum of 3 visits per week

## 2024-11-15 NOTE — H&P
[facility]  History & Physical    Subjective:      Chief Complaint/Reason for Admission: auditory hallucinations     Cesar Macedo is a 34 y.o. male. Hallucinations     No past medical history on file.  No past surgical history on file.  No family history on file.       PTA Medications   Medication Sig    cephALEXin (KEFLEX) 500 MG capsule Take 1 capsule (500 mg total) by mouth 4 (four) times daily. for 5 days    tamsulosin (FLOMAX) 0.4 mg Cap Take 1 capsule (0.4 mg total) by mouth once daily. for 5 days     Review of patient's allergies indicates:  No Known Allergies     Review of Systems   Constitutional: Negative.    HENT: Negative.     Eyes: Negative.    Respiratory: Negative.     Cardiovascular: Negative.    Gastrointestinal: Negative.    Genitourinary: Negative.    Musculoskeletal: Negative.    Skin: Negative.    Neurological: Negative.    Endo/Heme/Allergies: Negative.    Psychiatric/Behavioral:  Positive for hallucinations. Negative for depression, substance abuse and suicidal ideas.        Objective:      Vital Signs (Most Recent)  Temp: 97.8 °F (36.6 °C) (11/15/24 1100)  Pulse: 84 (11/15/24 1100)  Resp: 16 (11/15/24 1100)  BP: 129/79 (11/15/24 1100)  SpO2: 97 % (11/15/24 1100)    Vital Signs Range (Last 24H):  Temp:  [97.4 °F (36.3 °C)-98.6 °F (37 °C)]   Pulse:  []   Resp:  [16-20]   BP: (118-159)/()   SpO2:  [97 %-100 %]     Physical Exam  HENT:      Head: Normocephalic.      Right Ear: Tympanic membrane normal.      Left Ear: Tympanic membrane normal.      Nose: Nose normal.      Mouth/Throat:      Mouth: Mucous membranes are moist.   Eyes:      Extraocular Movements: Extraocular movements intact.      Pupils: Pupils are equal, round, and reactive to light.   Cardiovascular:      Rate and Rhythm: Normal rate and regular rhythm.   Pulmonary:      Effort: Pulmonary effort is normal.   Abdominal:      General: Abdomen is flat.   Musculoskeletal:          General: Normal range of motion.   Skin:     General: Skin is warm.   Neurological:      General: No focal deficit present.      Mental Status: He is alert and oriented to person, place, and time.      Comments: Vision normal   Hearing normal   EOM intact   Face muscles normal  Facial sensation normal   Shrugs shoulders  Tongue midline            Data Review:    Recent Results (from the past 48 hours)   CBC with Differential    Collection Time: 11/14/24  6:28 PM   Result Value Ref Range    WBC 9.92 4.50 - 11.50 x10(3)/mcL    RBC 5.23 4.70 - 6.10 x10(6)/mcL    Hgb 15.6 14.0 - 18.0 g/dL    Hct 44.4 42.0 - 52.0 %    MCV 84.9 80.0 - 94.0 fL    MCH 29.8 27.0 - 31.0 pg    MCHC 35.1 33.0 - 36.0 g/dL    RDW 12.5 11.5 - 17.0 %    Platelet 265 130 - 400 x10(3)/mcL    MPV 10.4 7.4 - 10.4 fL    Neut % 71.4 %    Lymph % 20.2 %    Mono % 6.7 %    Eos % 0.8 %    Basophil % 0.6 %    Lymph # 2.00 0.6 - 4.6 x10(3)/mcL    Neut # 7.09 2.1 - 9.2 x10(3)/mcL    Mono # 0.66 0.1 - 1.3 x10(3)/mcL    Eos # 0.08 0 - 0.9 x10(3)/mcL    Baso # 0.06 <=0.2 x10(3)/mcL    IG# 0.03 0 - 0.04 x10(3)/mcL    IG% 0.3 %    NRBC% 0.0 %   Comprehensive metabolic panel    Collection Time: 11/14/24  6:29 PM   Result Value Ref Range    Sodium 140 136 - 145 mmol/L    Potassium 3.7 3.5 - 5.1 mmol/L    Chloride 108 (H) 98 - 107 mmol/L    CO2 22 22 - 29 mmol/L    Glucose 112 (H) 74 - 100 mg/dL    Blood Urea Nitrogen 17.3 8.9 - 20.6 mg/dL    Creatinine 1.01 0.72 - 1.25 mg/dL    Calcium 9.8 8.4 - 10.2 mg/dL    Protein Total 7.7 6.4 - 8.3 gm/dL    Albumin 4.7 3.5 - 5.0 g/dL    Globulin 3.0 2.4 - 3.5 gm/dL    Albumin/Globulin Ratio 1.6 1.1 - 2.0 ratio    Bilirubin Total 0.9 <=1.5 mg/dL    ALP 81 40 - 150 unit/L    ALT 23 0 - 55 unit/L    AST 22 5 - 34 unit/L    eGFR >60 mL/min/1.73/m2    Anion Gap 10.0 mEq/L    BUN/Creatinine Ratio 17    TSH    Collection Time: 11/14/24  6:29 PM   Result Value Ref Range    TSH 1.781 0.350 - 4.940 uIU/mL   Ethanol    Collection Time:  11/14/24  6:29 PM   Result Value Ref Range    Ethanol Level <10.0 <=10.0 mg/dL   Acetaminophen level    Collection Time: 11/14/24  6:29 PM   Result Value Ref Range    Acetaminophen Level <3.0 (L) 10.0 - 30.0 ug/ml   Urinalysis, Reflex to Urine Culture    Collection Time: 11/14/24  6:54 PM    Specimen: Urine   Result Value Ref Range    Color, UA Yellow Yellow, Light-Yellow, Colorless, Straw, Dark-Yellow    Appearance, UA Turbid (A) Clear    Specific Gravity, UA 1.036 (H) 1.005 - 1.030    pH, UA 6.0 5.0 - 8.5    Protein, UA 3+ (A) Negative    Glucose, UA Normal Negative, Normal    Ketones, UA 2+ (A) Negative    Blood, UA 3+ (A) Negative    Bilirubin, UA Negative Negative    Urobilinogen, UA 2.0 (A) 0.2, 1.0, Normal    Nitrites, UA Negative Negative    Leukocyte Esterase, UA 75 (A) Negative    RBC, UA >100 (A) None Seen, 0-2, 3-5, 0-5 /HPF    WBC, UA 21-50 (A) None Seen, 0-2, 3-5, 0-5 /HPF    Bacteria, UA Few (A) None Seen, Trace /HPF    Squamous Epithelial Cells, UA None Seen None Seen, Trace, Rare /HPF    Mucous, UA Many (A) None Seen /LPF    Sperm, UA Many (A) (none) /HPF    Hyaline Casts, UA 0-2 (A) None Seen /lpf   Drug Screen, Urine    Collection Time: 11/14/24  6:54 PM   Result Value Ref Range    Amphetamines, Urine Positive (A) Negative    Barbiturates, Urine Negative Negative    Benzodiazepine, Urine Negative Negative    Cannabinoids, Urine Positive (A) Negative    Cocaine, Urine Negative Negative    Fentanyl, Urine Negative Negative    MDMA, Urine Negative Negative    Opiates, Urine Negative Negative    Phencyclidine, Urine Negative Negative    pH, Urine 6.0 3.0 - 11.0    Specific Gravity, Urine Auto 1.036 (H) 1.001 - 1.035   Hemoglobin A1C    Collection Time: 11/15/24  6:46 AM   Result Value Ref Range    Hemoglobin A1c 5.2 <=7.0 %    Estimated Average Glucose 102.5 mg/dL   Comprehensive Metabolic Panel    Collection Time: 11/15/24  6:46 AM   Result Value Ref Range    Sodium 143 136 - 145 mmol/L    Potassium  4.3 3.5 - 5.1 mmol/L    Chloride 105 98 - 107 mmol/L    CO2 30 (H) 22 - 29 mmol/L    Glucose 81 74 - 100 mg/dL    Blood Urea Nitrogen 18.3 8.9 - 20.6 mg/dL    Creatinine 0.89 0.72 - 1.25 mg/dL    Calcium 9.3 8.4 - 10.2 mg/dL    Protein Total 6.9 6.4 - 8.3 gm/dL    Albumin 4.1 3.5 - 5.0 g/dL    Globulin 2.8 2.4 - 3.5 gm/dL    Albumin/Globulin Ratio 1.5 1.1 - 2.0 ratio    Bilirubin Total 0.6 <=1.5 mg/dL    ALP 74 40 - 150 unit/L    ALT 21 0 - 55 unit/L    AST 22 5 - 34 unit/L    eGFR >60 mL/min/1.73/m2    Anion Gap 8.0 mEq/L    BUN/Creatinine Ratio 21    Lipid Panel    Collection Time: 11/15/24  6:46 AM   Result Value Ref Range    Cholesterol Total 143 <=200 mg/dL    HDL Cholesterol 40 35 - 60 mg/dL    Triglyceride 103 34 - 140 mg/dL    Cholesterol/HDL Ratio 4 0 - 5    Very Low Density Lipoprotein 21     LDL Cholesterol 82.00 50.00 - 140.00 mg/dL   TSH    Collection Time: 11/15/24  6:46 AM   Result Value Ref Range    TSH 2.177 0.350 - 4.940 uIU/mL   SYPHILIS ANTIBODY (WITH REFLEX RPR)    Collection Time: 11/15/24  6:46 AM   Result Value Ref Range    Syphilis Antibody Nonreactive Nonreactive, Equivocal   CBC with Differential    Collection Time: 11/15/24  6:46 AM   Result Value Ref Range    WBC 9.16 4.50 - 11.50 x10(3)/mcL    RBC 5.11 4.70 - 6.10 x10(6)/mcL    Hgb 15.2 14.0 - 18.0 g/dL    Hct 45.1 42.0 - 52.0 %    MCV 88.3 80.0 - 94.0 fL    MCH 29.7 27.0 - 31.0 pg    MCHC 33.7 33.0 - 36.0 g/dL    RDW 12.4 11.5 - 17.0 %    Platelet 246 130 - 400 x10(3)/mcL    MPV 10.8 (H) 7.4 - 10.4 fL    Neut % 59.7 %    Lymph % 28.5 %    Mono % 9.3 %    Eos % 1.5 %    Basophil % 0.7 %    Lymph # 2.61 0.6 - 4.6 x10(3)/mcL    Neut # 5.47 2.1 - 9.2 x10(3)/mcL    Mono # 0.85 0.1 - 1.3 x10(3)/mcL    Eos # 0.14 0 - 0.9 x10(3)/mcL    Baso # 0.06 <=0.2 x10(3)/mcL    IG# 0.03 0 - 0.04 x10(3)/mcL    IG% 0.3 %    NRBC% 0.0 %        No results found.       Assessment and Plan       Schizophrenia

## 2024-11-15 NOTE — ED PROVIDER NOTES
"Encounter Date: 11/14/2024    SCRIBE #1 NOTE: I, Esther Strong, am scribing for, and in the presence of,  Boris Pavon DO. I have scribed the following portions of the note - Other sections scribed: HPI, ROS, PE.       History     Chief Complaint   Patient presents with    Psychiatric Evaluation     Admits to doing crystal meth 3 hours ago. Pt arrives extremely paranoid. -SI/-HI. Reports hearing voices and feeling like he is being followed. GCS 15.     The patient is a 34 year old male with hx of drug abuse presenting to the ED due to paranoia onset 2 days. The patient complains of auditory hallucinations. The patient states that he is "hearing people that want him to die" and "hearing conversations about himself". He states "people are after him" The patient admits to drug usage, crystal meth, 3 hours pta and reports that he uses everyday. He admits to etoh usage. The patient denies SI and HI.     PEC: 1825.    The history is provided by the patient and medical records. No  was used.     Review of patient's allergies indicates:  No Known Allergies  No past medical history on file.  No past surgical history on file.  No family history on file.     Review of Systems   Psychiatric/Behavioral:  Positive for hallucinations (Auditory hallucinations.). Negative for suicidal ideas.         Paranoia.   Denies HI.          Physical Exam     Initial Vitals [11/14/24 1754]   BP Pulse Resp Temp SpO2   (!) 158/103 104 18 98.6 °F (37 °C) 99 %      MAP       --         Physical Exam    Nursing note and vitals reviewed.  Constitutional: He appears well-developed and well-nourished.   HENT:   Head: Normocephalic and atraumatic.   Right Ear: External ear normal.   Left Ear: External ear normal.   Nose: Nose normal.   Eyes: Conjunctivae and EOM are normal. Pupils are equal, round, and reactive to light.   Neck: Neck supple.   Normal range of motion.  Cardiovascular:  Normal rate, regular rhythm, normal heart " sounds and intact distal pulses.           Pulmonary/Chest: Breath sounds normal.   Abdominal: Abdomen is soft. Bowel sounds are normal.   Musculoskeletal:         General: Normal range of motion.      Cervical back: Normal range of motion and neck supple.     Neurological: He is alert and oriented to person, place, and time. He has normal strength. GCS score is 15. GCS eye subscore is 4. GCS verbal subscore is 5. GCS motor subscore is 6.   Skin: Skin is warm and dry. Capillary refill takes less than 2 seconds.   Psychiatric: His behavior is normal. Judgment and thought content normal. His mood appears anxious. He expresses no homicidal and no suicidal ideation. He expresses no suicidal plans and no homicidal plans.         ED Course   Procedures  Labs Reviewed   COMPREHENSIVE METABOLIC PANEL - Abnormal       Result Value    Sodium 140      Potassium 3.7      Chloride 108 (*)     CO2 22      Glucose 112 (*)     Blood Urea Nitrogen 17.3      Creatinine 1.01      Calcium 9.8      Protein Total 7.7      Albumin 4.7      Globulin 3.0      Albumin/Globulin Ratio 1.6      Bilirubin Total 0.9      ALP 81      ALT 23      AST 22      eGFR >60      Anion Gap 10.0      BUN/Creatinine Ratio 17     URINALYSIS, REFLEX TO URINE CULTURE - Abnormal    Color, UA Yellow      Appearance, UA Turbid (*)     Specific Gravity, UA 1.036 (*)     pH, UA 6.0      Protein, UA 3+ (*)     Glucose, UA Normal      Ketones, UA 2+ (*)     Blood, UA 3+ (*)     Bilirubin, UA Negative      Urobilinogen, UA 2.0 (*)     Nitrites, UA Negative      Leukocyte Esterase, UA 75 (*)     RBC, UA >100 (*)     WBC, UA 21-50 (*)     Bacteria, UA Few (*)     Squamous Epithelial Cells, UA None Seen      Mucous, UA Many (*)     Sperm, UA Many (*)     Hyaline Casts, UA 0-2 (*)    DRUG SCREEN, URINE (BEAKER) - Abnormal    Amphetamines, Urine Positive (*)     Barbiturates, Urine Negative      Benzodiazepine, Urine Negative      Cannabinoids, Urine Positive (*)      Cocaine, Urine Negative      Fentanyl, Urine Negative      MDMA, Urine Negative      Opiates, Urine Negative      Phencyclidine, Urine Negative      pH, Urine 6.0      Specific Gravity, Urine Auto 1.036 (*)     Narrative:     Cut off concentrations:    Amphetamines - 1000 ng/ml  Barbiturates - 200 ng/ml  Benzodiazepine - 200 ng/ml  Cannabinoids (THC) - 50 ng/ml  Cocaine - 300 ng/ml  Fentanyl - 1.0 ng/ml  MDMA - 500 ng/ml  Opiates - 300 ng/ml   Phencyclidine (PCP) - 25 ng/ml    Specimen submitted for drug analysis and tested for pH and specific gravity in order to evaluate sample integrity. Suspect tampering if specific gravity is <1.003 and/or pH is not within the range of 4.5 - 8.0  False negatives may result form substances such as bleach added to urine.  False positives may result for the presence of a substance with similar chemical structure to the drug or its metabolite.    This test provides only a PRELIMINARY analytical test result. A more specific alternate chemical method must be used in order to obtain a confirmed analytical result. Gas chromatography/mass spectrometry (GC/MS) is the preferred confirmatory method. Other chemical confirmation methods are available. Clinical consideration and professional judgement should be applied to any drug of abuse test result, particularly when preliminary positive results are used.    Positive results will be confirmed only at the physicians request. Unconfirmed screening results are to be used only for medical purposes (treatment).        ACETAMINOPHEN LEVEL - Abnormal    Acetaminophen Level <3.0 (*)    TSH - Normal    TSH 1.781     ALCOHOL,MEDICAL (ETHANOL) - Normal    Ethanol Level <10.0     CULTURE, URINE   CBC W/ AUTO DIFFERENTIAL    Narrative:     The following orders were created for panel order CBC auto differential.  Procedure                               Abnormality         Status                     ---------                               -----------          ------                     CBC with Differential[[id number]]                           Final result                 Please view results for these tests on the individual orders.   CBC WITH DIFFERENTIAL    WBC 9.92      RBC 5.23      Hgb 15.6      Hct 44.4      MCV 84.9      MCH 29.8      MCHC 35.1      RDW 12.5      Platelet 265      MPV 10.4      Neut % 71.4      Lymph % 20.2      Mono % 6.7      Eos % 0.8      Basophil % 0.6      Lymph # 2.00      Neut # 7.09      Mono # 0.66      Eos # 0.08      Baso # 0.06      IG# 0.03      IG% 0.3      NRBC% 0.0            Imaging Results    None          Medications   cephALEXin capsule 500 mg (has no administration in time range)   LORazepam tablet 2 mg (2 mg Oral Given 11/14/24 1829)     Medical Decision Making  Differential diagnosis includes but is not limited to, substance abuse, malingering, electrolyte abnormality, acute psychosis, depression.    34-year-old male presenting with acute psychosis.  Patient is medically cleared.  UA does show a UTI.  Patient is asymptomatic but will treat with Keflex.  UDS is positive for marijuana and methamphetamines.  Patient will be transferred for psych for further treatment and evaluation.  He was given Ativan as he was very anxious, he has been compliant thus far.            Problems Addressed:  Acute cystitis with hematuria: acute illness or injury  Acute psychosis: acute illness or injury with systemic symptoms that poses a threat to life or bodily functions  Substance abuse: acute illness or injury    Amount and/or Complexity of Data Reviewed  External Data Reviewed: labs, radiology and notes.  Labs: ordered. Decision-making details documented in ED Course.    Risk  Prescription drug management.  Decision regarding hospitalization.            Scribe Attestation:   Scribe #1: I performed the above scribed service and the documentation accurately describes the services I performed. I attest to the accuracy of the  note.    Attending Attestation:           Physician Attestation for Scribe:  Physician Attestation Statement for Scribe #1: I, Boris Pavon DO, reviewed documentation, as scribed by Esther Strong in my presence, and it is both accurate and complete.                Medically cleared for psychiatry placement: 11/14/2024  7:52 PM                   Clinical Impression:  Final diagnoses:  [F23] Acute psychosis (Primary)  [F19.10] Substance abuse  [N30.01] Acute cystitis with hematuria          ED Disposition Condition    Transfer to Psych Facility Stable          ED Prescriptions    None       Follow-up Information    None          Boris Pavon DO  11/14/24 1954

## 2024-11-15 NOTE — PROGRESS NOTES
11/15/24 1100   [facility] Group Therapy   Group Name Therapeutic Recreation   Specific Interventions Skilled Activity Mild Exercises   Participation Level None   Participation Quality Conflict w/ Other;Services

## 2024-11-15 NOTE — PROGRESS NOTES
11/15/24 1400   [facility] Group Therapy   Group Name Therapeutic Recreation   Specific Interventions Skilled Activity Leisure Education and Awareness   Participation Level None   Participation Quality Sleeping  (excused)

## 2024-11-15 NOTE — PLAN OF CARE
Problem: Adult Behavioral Health Plan of Care  Goal: Plan of Care Review  Outcome: Not Progressing  Goal: Patient-Specific Goal (Individualization)  Outcome: Not Progressing  Goal: Adheres to Safety Considerations for Self and Others  Outcome: Not Progressing  Goal: Absence of New-Onset Illness or Injury  Outcome: Not Progressing  Goal: Optimized Coping Skills in Response to Life Stressors  Outcome: Not Progressing  Goal: Develops/Participates in Therapeutic [place] to Support Successful Transition  Outcome: Not Progressing  Goal: Rounds/Family Conference  Outcome: Not Progressing     Problem: Psychotic Signs/Symptoms  Goal: Improved Behavioral Control (Psychotic Signs/Symptoms)  Outcome: Not Progressing  Goal: Optimal Cognitive Function (Psychotic Signs/Symptoms)  Outcome: Not Progressing  Goal: Increased Participation and Engagement (Psychotic Signs/Symptoms)  Outcome: Not Progressing  Goal: Improved Mood Symptoms (Psychotic Signs/Symptoms)  Outcome: Not Progressing  Goal: Improved Psychomotor Symptoms (Psychotic Signs/Symptoms)  Outcome: Not Progressing  Goal: Decreased Sensory Symptoms (Psychotic Signs/Symptoms)  Outcome: Not Progressing  Goal: Improved Sleep (Psychotic Signs/Symptoms)  Outcome: Not Progressing  Goal: Enhanced Social, Occupational or Functional Skills (Psychotic Signs/Symptoms)  Outcome: Not Progressing     Problem: Excessive Substance Use  Goal: Optimized Energy Level (Excessive Substance Use)  Outcome: Not Progressing  Goal: Improved Behavioral Control (Excessive Substance Use)  Outcome: Not Progressing  Goal: Increased Participation and Engagement (Excessive Substance Use)  Outcome: Not Progressing  Goal: Improved Physiologic Symptoms (Excessive Substance Use)  Outcome: Not Progressing  Goal: Enhanced Social, Occupational or Functional Skills (Excessive Substance Use)  Outcome: Not Progressing     Admission Note:     Cesar Macedo is a 34-year-old , male, : 1990, MRN: [id number],  "admitted on 11/15/2024 to [facility] ([facility]) for Huey Pineda MD with Psychosis. Patient admitted on a status of Physician Emergency Certificate (PEC). No known determined allergies reported. He reported a history of Manic Depression, Bipolar Disorder, and schizoaffective disorder.      Cesar is AAO x 2 (disoriented to location and situation). His gait is within normal limits, however his speech is disorganized and consists of unrelated content (flight of ideas). Cesar said, I umm dont know how to put it...4 ways... but Orthopedics like for joints and stuff? Pain?! I got pain on my side...to get a checkup to my kidneys? 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 The fuck.... cant find my laptop! His appearance is disheveled and unkept and although anxious, he is cooperative. Agitation and/or aggression is not observed.  He denied suicidal ideations, homicidal ideations, and delusions but endorses auditory hallucinations (voices, screams), feelings of anxiety, and substance abuse does Meth and weed regularly. Cesar said, No...I can go to bed right now. I can sleep. No problems eating reported.        Cesar is 6' 0" and weights 222.4 lbs. Her blood pressure is 131/85, pulse is 76, respirations 17 and oxygen saturation is 100% on room air.      "

## 2024-11-15 NOTE — H&P
"11/15/2024  Csear Macedo   1990   [id number]            Psychiatry Inpatient Admission Note    Date of Admission: 11/15/2024  1:37 AM    Current Legal Status: Physician's Emergency Certificate    Chief Complaint: Hallucinations and feeling down    SUBJECTIVE:   History of Present Illness:   Cesar Macedo is a 34 y.o. male [place] [facility] [place] paranoia and auditory hallucinations following methamphetamine use approximately three hours before his arrival at the ED. He has a documented history of substance abuse and reported in the ED that he heard "people that want him to die" and "conversations about himself."     During today's evaluation, the patient presented to the exam room appearing calm and cooperative. Staff have observed hyperactive behavior immediately upon arrival but note no overt behavioral issues. The patient denies any significant history of mental health conditions and reports that he has never experienced hallucinations of this nature before, suggesting that the psychotic episode was likely substance-induced. He does acknowledge a history of depression and anxiety for which he has received treatment in the past, though he is unable to recall the specific medications he has used. When prompted about a history with Zoloft, he recognized the name and expressed no negative experiences with it, showing a willingness to consider restarting it. The patient denies any suicidal or homicidal ideation at this time.    He has expressed a desire to enter a rehabilitation program following discharge.    The plan is to admit the patient for medication management and continued safety monitoring.          Past Psychiatric History:   Previous Psychiatric Hospitalizations: Denies   Previous Medication Trials: Denies  Previous Suicide Attempts: Denies   Outpatient psychiatrist: Denies    Current Medications:   Home Psychiatric Meds: Denies    Past Medical/Surgical History:   No past medical history on " "file.  No past surgical history on file.    Family Psychiatric History:   Aunt - bipolar     Allergies:   Review of patient's allergies indicates:  No Known Allergies    Substance Abuse History:   Tobacco: 1 PPD  Alcohol: 12 Pack beer day for two years  Illicit Substances: Meth, THC, Crack, pain pills  Treatment: Mulitple        Scheduled Meds:    nicotine  1 patch Transdermal Daily      PRN Meds:   Current Facility-Administered Medications:     [START ON 11/16/2024] acetaminophen, 650 mg, Oral, Q6H PRN    [START ON 11/16/2024] aluminum-magnesium hydroxide-simethicone, 30 mL, Oral, Q6H PRN    [START ON 11/16/2024] haloperidoL, 10 mg, Oral, Q4H PRN **AND** [START ON 11/16/2024] diphenhydrAMINE, 50 mg, Oral, Q4H PRN **AND** [START ON 11/16/2024] LORazepam, 2 mg, Oral, Q4H PRN **AND** [START ON 11/16/2024] haloperidol lactate, 10 mg, Intramuscular, Q4H PRN **AND** [START ON 11/16/2024] diphenhydrAMINE, 50 mg, Intramuscular, Q4H PRN **AND** [START ON 11/16/2024] lorazepam, 2 mg, Intramuscular, Q4H PRN    hydrOXYzine HCL, 50 mg, Oral, Q4H PRN    traZODone, 100 mg, Oral, Nightly PRN   Psychotherapeutics (From admission, onward)      Start     Stop Route Frequency Ordered    11/16/24 0000  haloperidoL tablet 10 mg  (Med - Acute  Behavioral Management)        Placed in "And" Linked Group    -- Oral Every 4 hours PRN 11/15/24 0137    11/16/24 0000  LORazepam tablet 2 mg  (Med - Acute  Behavioral Management)        Placed in "And" Linked Group    -- Oral Every 4 hours PRN 11/15/24 0137    11/16/24 0000  haloperidol lactate injection 10 mg  (Med - Acute  Behavioral Management)        Placed in "And" Linked Group    -- IM Every 4 hours PRN 11/15/24 0137    11/16/24 0000  LORazepam injection 2 mg  (Med - Acute  Behavioral Management)        Placed in "And" Linked Group    -- IM Every 4 hours PRN 11/15/24 0137    11/15/24 0137  traZODone tablet 100 mg         -- Oral Nightly PRN 11/15/24 0137              Social History:  Housing " Status: Homeless  Relationship Status/Sexual Orientation: Single   Children: Two  Education: Vocational, AIC   Employment Status/Info: [employer]    history: [employer]. Honorable discharge  History of physical/sexual abuse: Denies   Access to gun: Denies       Legal History:   Past Charges/Incarcerations: Yes   Pending charges: Denies      OBJECTIVE:   Medical Review Of Systems:  Kidney stones, Swelling in feet    Vitals   Vitals:    11/15/24 0158   BP: 131/83   Pulse: 85   Resp: 19   Temp: 97.7 °F (36.5 °C)        Labs/Imaging/Studies:   Recent Results (from the past 48 hours)   CBC with Differential    Collection Time: 11/14/24  6:28 PM   Result Value Ref Range    WBC 9.92 4.50 - 11.50 x10(3)/mcL    RBC 5.23 4.70 - 6.10 x10(6)/mcL    Hgb 15.6 14.0 - 18.0 g/dL    Hct 44.4 42.0 - 52.0 %    MCV 84.9 80.0 - 94.0 fL    MCH 29.8 27.0 - 31.0 pg    MCHC 35.1 33.0 - 36.0 g/dL    RDW 12.5 11.5 - 17.0 %    Platelet 265 130 - 400 x10(3)/mcL    MPV 10.4 7.4 - 10.4 fL    Neut % 71.4 %    Lymph % 20.2 %    Mono % 6.7 %    Eos % 0.8 %    Basophil % 0.6 %    Lymph # 2.00 0.6 - 4.6 x10(3)/mcL    Neut # 7.09 2.1 - 9.2 x10(3)/mcL    Mono # 0.66 0.1 - 1.3 x10(3)/mcL    Eos # 0.08 0 - 0.9 x10(3)/mcL    Baso # 0.06 <=0.2 x10(3)/mcL    IG# 0.03 0 - 0.04 x10(3)/mcL    IG% 0.3 %    NRBC% 0.0 %   Comprehensive metabolic panel    Collection Time: 11/14/24  6:29 PM   Result Value Ref Range    Sodium 140 136 - 145 mmol/L    Potassium 3.7 3.5 - 5.1 mmol/L    Chloride 108 (H) 98 - 107 mmol/L    CO2 22 22 - 29 mmol/L    Glucose 112 (H) 74 - 100 mg/dL    Blood Urea Nitrogen 17.3 8.9 - 20.6 mg/dL    Creatinine 1.01 0.72 - 1.25 mg/dL    Calcium 9.8 8.4 - 10.2 mg/dL    Protein Total 7.7 6.4 - 8.3 gm/dL    Albumin 4.7 3.5 - 5.0 g/dL    Globulin 3.0 2.4 - 3.5 gm/dL    Albumin/Globulin Ratio 1.6 1.1 - 2.0 ratio    Bilirubin Total 0.9 <=1.5 mg/dL    ALP 81 40 - 150 unit/L    ALT 23 0 - 55 unit/L    AST 22 5 - 34 unit/L    eGFR >60  mL/min/1.73/m2    Anion Gap 10.0 mEq/L    BUN/Creatinine Ratio 17    TSH    Collection Time: 11/14/24  6:29 PM   Result Value Ref Range    TSH 1.781 0.350 - 4.940 uIU/mL   Ethanol    Collection Time: 11/14/24  6:29 PM   Result Value Ref Range    Ethanol Level <10.0 <=10.0 mg/dL   Acetaminophen level    Collection Time: 11/14/24  6:29 PM   Result Value Ref Range    Acetaminophen Level <3.0 (L) 10.0 - 30.0 ug/ml   Urinalysis, Reflex to Urine Culture    Collection Time: 11/14/24  6:54 PM    Specimen: Urine   Result Value Ref Range    Color, UA Yellow Yellow, Light-Yellow, Colorless, Straw, Dark-Yellow    Appearance, UA Turbid (A) Clear    Specific Gravity, UA 1.036 (H) 1.005 - 1.030    pH, UA 6.0 5.0 - 8.5    Protein, UA 3+ (A) Negative    Glucose, UA Normal Negative, Normal    Ketones, UA 2+ (A) Negative    Blood, UA 3+ (A) Negative    Bilirubin, UA Negative Negative    Urobilinogen, UA 2.0 (A) 0.2, 1.0, Normal    Nitrites, UA Negative Negative    Leukocyte Esterase, UA 75 (A) Negative    RBC, UA >100 (A) None Seen, 0-2, 3-5, 0-5 /HPF    WBC, UA 21-50 (A) None Seen, 0-2, 3-5, 0-5 /HPF    Bacteria, UA Few (A) None Seen, Trace /HPF    Squamous Epithelial Cells, UA None Seen None Seen, Trace, Rare /HPF    Mucous, UA Many (A) None Seen /LPF    Sperm, UA Many (A) (none) /HPF    Hyaline Casts, UA 0-2 (A) None Seen /lpf   Drug Screen, Urine    Collection Time: 11/14/24  6:54 PM   Result Value Ref Range    Amphetamines, Urine Positive (A) Negative    Barbiturates, Urine Negative Negative    Benzodiazepine, Urine Negative Negative    Cannabinoids, Urine Positive (A) Negative    Cocaine, Urine Negative Negative    Fentanyl, Urine Negative Negative    MDMA, Urine Negative Negative    Opiates, Urine Negative Negative    Phencyclidine, Urine Negative Negative    pH, Urine 6.0 3.0 - 11.0    Specific Gravity, Urine Auto 1.036 (H) 1.001 - 1.035   Hemoglobin A1C    Collection Time: 11/15/24  6:46 AM   Result Value Ref Range     "Hemoglobin A1c 5.2 <=7.0 %    Estimated Average Glucose 102.5 mg/dL   Comprehensive Metabolic Panel    Collection Time: 11/15/24  6:46 AM   Result Value Ref Range    Sodium 143 136 - 145 mmol/L    Potassium 4.3 3.5 - 5.1 mmol/L    Chloride 105 98 - 107 mmol/L    CO2 30 (H) 22 - 29 mmol/L    Glucose 81 74 - 100 mg/dL    Blood Urea Nitrogen 18.3 8.9 - 20.6 mg/dL    Creatinine 0.89 0.72 - 1.25 mg/dL    Calcium 9.3 8.4 - 10.2 mg/dL    Protein Total 6.9 6.4 - 8.3 gm/dL    Albumin 4.1 3.5 - 5.0 g/dL    Globulin 2.8 2.4 - 3.5 gm/dL    Albumin/Globulin Ratio 1.5 1.1 - 2.0 ratio    Bilirubin Total 0.6 <=1.5 mg/dL    ALP 74 40 - 150 unit/L    ALT 21 0 - 55 unit/L    AST 22 5 - 34 unit/L    eGFR >60 mL/min/1.73/m2    Anion Gap 8.0 mEq/L    BUN/Creatinine Ratio 21    Lipid Panel    Collection Time: 11/15/24  6:46 AM   Result Value Ref Range    Cholesterol Total 143 <=200 mg/dL    HDL Cholesterol 40 35 - 60 mg/dL    Triglyceride 103 34 - 140 mg/dL    Cholesterol/HDL Ratio 4 0 - 5    Very Low Density Lipoprotein 21     LDL Cholesterol 82.00 50.00 - 140.00 mg/dL   TSH    Collection Time: 11/15/24  6:46 AM   Result Value Ref Range    TSH 2.177 0.350 - 4.940 uIU/mL   CBC with Differential    Collection Time: 11/15/24  6:46 AM   Result Value Ref Range    WBC 9.16 4.50 - 11.50 x10(3)/mcL    RBC 5.11 4.70 - 6.10 x10(6)/mcL    Hgb 15.2 14.0 - 18.0 g/dL    Hct 45.1 42.0 - 52.0 %    MCV 88.3 80.0 - 94.0 fL    MCH 29.7 27.0 - 31.0 pg    MCHC 33.7 33.0 - 36.0 g/dL    RDW 12.4 11.5 - 17.0 %    Platelet 246 130 - 400 x10(3)/mcL    MPV 10.8 (H) 7.4 - 10.4 fL    Neut % 59.7 %    Lymph % 28.5 %    Mono % 9.3 %    Eos % 1.5 %    Basophil % 0.7 %    Lymph # 2.61 0.6 - 4.6 x10(3)/mcL    Neut # 5.47 2.1 - 9.2 x10(3)/mcL    Mono # 0.85 0.1 - 1.3 x10(3)/mcL    Eos # 0.14 0 - 0.9 x10(3)/mcL    Baso # 0.06 <=0.2 x10(3)/mcL    IG# 0.03 0 - 0.04 x10(3)/mcL    IG% 0.3 %    NRBC% 0.0 %      No results found for: "PHENYTOIN", "PHENOBARB", "VALPROATE", " ""CBMZ"        Psychiatric Mental Status Exam:  General Appearance: appears stated age, well developed and nourished, adequately groomed and appropriately dressed, in no acute distress  Arousal: alert with clear sensorium  Behavior: normal; cooperative; reasonably friendly, pleasant, and polite; appropriate eye-contact; under good behavioral control  Movements and Motor Activity: no abnormal involuntary movements noted; no tics, no tremors, no akathisia, no dystonia, no evidence of tardive dyskinesia; no psychomotor agitation or retardation  Orientation: intact; oriented fully to person, place, time and situation  Speech: intact; normal rate, rhythm, volume, tone and pitch; conversational, spontaneous, and coherent  Mood: Depressed  Affect: blunted  Thought Process: intact, linear, goal-directed, organized, logical  Associations: intact, no loosening of associations  Thought Content and Perceptions: no suicidal or homicidal ideation, no auditory or visual hallucinations, no paranoid ideation, no ideas of reference, no evidence of delusions or psychosis  Recent and Remote Memory: grossly intact, able to recall relevant and salient information from the recent and remote past; per interview/observation with patient  Attention and Concentration: grossly intact, attentive to the conversation and not readily distractible; per interview/observation with patient  Fund of Knowledge: grossly intact, used appropriate vocabulary and demonstrated an awareness of current events; based on history, vocabulary, fund of knowledge, syntax, grammar, and content  Insight: questionable; based on understanding of severity of illness and HPI  Judgment: questionable; based on patient's behavior and HPI      Patient Strengths:  Access to care, Able to verbalize needs, Motivation for treatment, and Capable of independent living      Patient Liabilities:  Medication non-compliance, Substance use, Depression, Anxiety, Chronic psychiatric " illness, and Housing stressors      Discharge Criteria:  Improved mood, Improved thought process, Medication compliance, Improved coping skills, Improved health maintenance, Decreased anxiety, and Improved social functioning      Reason for Admission:  The patient poses a significant and immediate danger to self.    ASSESSMENT/PLAN:   Diagnoses:  SUBSTANCE-RELATED DISORDERS; Alcohol-Related Disorders; Alcohol Abuse Without Physiological Dependence , Amphetamine Related Disorders; Amphetamine Abuse , and Cannabis-Related Disorders; Cannabis Abuse (F12.10), MOOD DISORDERS; Depressive Disorder NOS (F32.9), and ANXIETY DISORDERS; 7.8.Acute Stress Disorder (F43.9)         No past medical history on file.       Problem lists and Management Plans:  -Admit to [facility]    Mood   -Zoloft 50 mg    Polysubstance abuse  -Ativan detox protocol  -Group/Individual therapy    -Will attempt to obtain outside psychiatric records if available  - to assist with aftercare planning and collateral  -Continue inpatient treatment as evidenced by significant psychotic thought disorder and danger to self      Estimated length of stay: 5-7    Estimated Disposition: Substance treatment program    Estimated Follow-up: Substance treatment program          Julito Bradshaw [id number]

## 2024-11-15 NOTE — PLAN OF CARE
"Behavioral Health Unit  Psychosocial History and Assessment  Progress Note      Patient Name: Cesar Macedo YOB: 1990 SW: CHRISTINE English,JD [facility] Date: 11/15/2024    Chief Complaint: addictive disorder and psychosis    Consent:     Did the patient consent for an interview with the ? Yes    Did the patient consent for the  to contact family/friend/caregiver?   Yes  Name: Adarsh Macedo, Relationship: father, and Contact: [phone]    Did the patient give consent for the  to inform family/friend/caregiver of his/her whereabouts or to discuss discharge planning? Yes    Source of Information: Face to face with patient and Chart review    Is information obtained from interviews considered reliable?   yes    Reason for Admission:     There are no hospital problems to display for this patient.      History of Present Illness - (Patient Perception):   Pt stated he felt like people were following him and he was having a nervious breakdown and hearing voices. Pt stated he started running around [place] to get away from the people he thought were trying to kill him.       Present biopsychosocial functioning: denied SI, emotional, cooperative     Past biopsychosocial functioning: hx of SA a couple times via overdose on substances use- last felt SI before coming inpatient     Family and Marital/Relationship History:     Significant Other/Partner Relationships:  Single:  2 children     Family Relationships: Strained      Childhood History:     Where was patient raised? [place]    Who raised the patient? "Everybody, family"      How does patient describe their childhood? "Okay"      Who is patient's primary support person? father      Culture and Christianity:     Christianity: Jayess     How strong of a role does Voodoo and spirituality play in patient's life? Fair     Quaker or spiritual concerns regarding treatment: observation of holy days  and spiritual concerns " / distress    History of Abuse:   History of Abuse: Denies      Outcome: n/a    Psychiatric and Medical History:     History of psychiatric illness or treatment: prior inpatient treatment, prior suicide attempt(s), and has participated in counseling/psychotherapy on an outpatient basis in the past    Medical history: No past medical history on file.    Substance Abuse History:     Alcohol - (Patient Perspective): Pt stated he drinks a 12pk of beer and a couple shots of whiskey daily.  Social History     Substance and Sexual Activity   Alcohol Use Not on file         Drugs - (Patient Perspective): meth, cocaine, crack, fentanyl via oral or snorting, pain pills (chewing)  Social History     Substance and Sexual Activity   Drug Use Not on file         Education:     Currently Enrolled? No  High School (9-12) or GED    Special Education? No    Interested in Completing Education/GED: No    Employment and Financial:     Currently employed? Unemployed Pt was working in [employer] but stated he only has one more check coming in    Source of Income: salary    Able to afford basic needs (food, shelter, utilities)? Yes    Who manages finances/personal affairs? self      Service:     ? yes    Combat Service? No     Community Resources:     Describe present use of community resources: inpatient mh      Identify previously used community resources   (Include previous mental health treatment - outpatient and inpatient): inpatient mh, outpatient mh     Environmental:     Current living situation:was living with brother and sister-in-law; homeless     Social Evaluation:     Patient Assets: General fund of knowledge, Capable of independent living, Work skills, and Financial means    Patient Limitations: poor coping skills, homeless, potential for relapse     High risk psychosocial issues that may impact discharge planning:   homeless    Recommendations:     Anticipated discharge plan:   rehab    High risk issues  requiring early treatment planning and immediate intervention: homeless     Community resources needed for discharge planning:  housing, living arrangements, and aftercare treatment sources    Anticipated social work role(s) in treatment and discharge planning: SW will  and offer advice along with group therapy, ind as necessary and dc planning.    11/15/24 7788   Initial Information   Source of Information patient   Reason for Admission psychosis   Patient Aware of Diagnosis yes   Arrived From emergency department   Current or Previous  Service none   Legal Status    Type of Admission Involuntary   Type of Involuntary Admission PEC   Spiritual Beliefs   Spiritual, Cultural Beliefs, Amish Practices, Values that Affect Care yes   Description of Beliefs that Will Affect Care Denominational   Substance Use/Withdrawal   Substance Use Current, used prior to admission   Additional Tobacco Use   How many cigarettes do you typically have per day? 20   What additional types of tobacco do you currently use? Other (see comments)  (vapes)   Abuse Screen (yes response referral indicated)   Feels Unsafe at Home or Work/School no   Feels Threatened by Someone no   Does anyone try to keep you from having contact with others or doing things outside your home? no   Physical Signs of Abuse Present no   Abuse Details   Physical Abuse No   Sexual Abuse No   Emotional Abuse No   AUDIT-C (Alcohol Use Disorders ID Test)   Alcohol Use In Past Year 4-->four or more times a week   Alcohol Amount Per Day In Past Year 4-->ten or more   More Than 6 Drinks On One Occasion In Past Year 4-->daily or almost daily   Total Audit C Score 12

## 2024-11-15 NOTE — ED NOTES
"Assumed care from [facility], patient c/o hallucinations or reality "unsure" of people coming after him. Patient admitted to using crystal meth and often does. Severe paranoia exhibited. Admits to hearing voices talking about him "pedo" to which patient says "I'm not, nothing on my phone you can check", reassured patient no one is in the room with us aside from , MHT, and myself.   "

## 2024-11-16 LAB — BACTERIA UR CULT: NO GROWTH

## 2024-11-16 PROCEDURE — S4991 NICOTINE PATCH NONLEGEND: HCPCS | Performed by: PSYCHIATRY & NEUROLOGY

## 2024-11-16 PROCEDURE — 12400001 HC PSYCH SEMI-PRIVATE ROOM

## 2024-11-16 PROCEDURE — 25000003 PHARM REV CODE 250: Performed by: PSYCHIATRY & NEUROLOGY

## 2024-11-16 PROCEDURE — 25000003 PHARM REV CODE 250

## 2024-11-16 PROCEDURE — 25000003 PHARM REV CODE 250: Performed by: PEDIATRICS

## 2024-11-16 RX ADMIN — CEPHALEXIN 500 MG: 500 CAPSULE ORAL at 05:11

## 2024-11-16 RX ADMIN — CEPHALEXIN 500 MG: 500 CAPSULE ORAL at 08:11

## 2024-11-16 RX ADMIN — HYDROXYZINE HYDROCHLORIDE 50 MG: 50 TABLET, FILM COATED ORAL at 08:11

## 2024-11-16 RX ADMIN — LORAZEPAM 2 MG: 1 TABLET ORAL at 02:11

## 2024-11-16 RX ADMIN — LORAZEPAM 2 MG: 1 TABLET ORAL at 08:11

## 2024-11-16 RX ADMIN — CEPHALEXIN 500 MG: 500 CAPSULE ORAL at 12:11

## 2024-11-16 RX ADMIN — TAMSULOSIN HYDROCHLORIDE 0.4 MG: 0.4 CAPSULE ORAL at 08:11

## 2024-11-16 RX ADMIN — NICOTINE 1 PATCH: 21 PATCH, EXTENDED RELEASE TRANSDERMAL at 08:11

## 2024-11-16 RX ADMIN — TRAZODONE HYDROCHLORIDE 100 MG: 100 TABLET ORAL at 08:11

## 2024-11-16 RX ADMIN — SERTRALINE HYDROCHLORIDE 50 MG: 50 TABLET ORAL at 08:11

## 2024-11-16 NOTE — PLAN OF CARE
Problem: Psychotic Signs/Symptoms  Goal: Improved Behavioral Control (Psychotic Signs/Symptoms)  Outcome: Progressing  Flowsheets (Taken 11/16/2024 0845)  Mutually Determined Action Steps (Improved Behavioral Control): identifies symptoms triggers  Intervention: Manage Behavior  Flowsheets (Taken 11/16/2024 0845)  De-Escalation Techniques:   quiet time facilitated   appropriate behavior reinforced  Goal: Optimal Cognitive Function (Psychotic Signs/Symptoms)  Outcome: Progressing  Flowsheets (Taken 11/16/2024 0845)  Mutually Determined Action Steps (Optimal Cognitive Function): participates in attention training  Intervention: Support and Promote Cognitive Ability  Flowsheets (Taken 11/16/2024 0845)  Trust Relationship/Rapport:   care explained   thoughts/feelings acknowledged  Goal: Increased Participation and Engagement (Psychotic Signs/Symptoms)  Outcome: Progressing  Flowsheets (Taken 11/16/2024 0845)  Mutually Determined Action Steps (Increased Participation and Engagement): identifies symptoms triggers  Intervention: Facilitate Participation and Engagement  Flowsheets (Taken 11/16/2024 0845)  Supportive Measures:   active listening utilized   self-reflection promoted   self-responsibility promoted  Diversional Activity: television  Goal: Improved Mood Symptoms (Psychotic Signs/Symptoms)  Outcome: Progressing  Flowsheets (Taken 11/16/2024 0845)  Mutually Determined Action Steps (Improved Mood Symptoms): acknowledges progress  Intervention: Optimize Emotion and Mood  Flowsheets (Taken 11/16/2024 0845)  Supportive Measures:   active listening utilized   self-reflection promoted   self-responsibility promoted  Diversional Activity: television  Goal: Improved Psychomotor Symptoms (Psychotic Signs/Symptoms)  Flowsheets (Taken 11/16/2024 0845)  Mutually Determined Action Steps (Improved Psychomotor Symptoms):   adheres to medication regimen   exhibits decrease in agitation  Intervention: Manage Psychomotor  "Movement  Flowsheets (Taken 11/16/2024 0845)  Activity (Behavioral Health): up ad tirso  Diversional Activity: television  Goal: Decreased Sensory Symptoms (Psychotic Signs/Symptoms)  Flowsheets (Taken 11/16/2024 0845)  Mutually Determined Action Steps (Decreased Sensory Symptoms): adheres to medication regimen     Problem: Excessive Substance Use  Goal: Increased Participation and Engagement (Excessive Substance Use)  Outcome: Progressing       AAOx4. Unkempt appearance. Speech with normal rate and tone. Steady gait. Flat affect, anxious and depressed mood. Denies suicidal ideations. Denies homicidal ideations.  Patient comments that "things are going on"   Patient also says the voices and telling him "Fuck you and stuff"  He does intermittently talks to self.. Interacting well with staff and peers. Attended group today. Fair eye contact. States that she is eating and sleeping well. Compliant with medications.. No agitation or aggression noted. Continue with plan of care and q 15 minute safety checks this shift.     "

## 2024-11-16 NOTE — PLAN OF CARE
Problem: Adult Behavioral Health Plan of Care  Goal: Plan of Care Review  Outcome: Not Progressing  Flowsheets (Taken 11/15/2024 2237)  Patient Agreement with Plan of Care: agrees  Plan of Care Reviewed With: patient  Goal: Patient-Specific Goal (Individualization)  Outcome: Not Progressing  Flowsheets (Taken 11/15/2024 2237)  Patient Personal Strengths: independent living skills  Patient Vulnerabilities: substance abuse/addiction  Goal: Adheres to Safety Considerations for Self and Others  Outcome: Not Progressing  Flowsheets (Taken 11/15/2024 2237)  Adheres to Safety Considerations for Self and Others: unable to achieve outcome  Intervention: Develop and Maintain Individualized Safety Plan  Flowsheets (Taken 11/15/2024 2237)  Safety Measures:   safety rounds completed   monitored by video  Goal: Absence of New-Onset Illness or Injury  Outcome: Not Progressing  Intervention: Identify and Manage Fall Risk  Flowsheets (Taken 11/15/2024 2237)  Safety Measures:   safety rounds completed   monitored by video  Intervention: Prevent VTE (Venous Thromboembolism)  Flowsheets (Taken 11/15/2024 2237)  VTE Prevention/Management:   ambulation promoted   fluids promoted  Intervention: Prevent Infection  Flowsheets (Taken 11/15/2024 2237)  Infection Prevention:   hand hygiene promoted   rest/sleep promoted  Goal: Optimized Coping Skills in Response to Life Stressors  Outcome: Not Progressing  Flowsheets (Taken 11/15/2024 2237)  Optimized Coping Skills in Response to Life Stressors: unable to achieve outcome  Intervention: Promote Effective Coping Strategies  Flowsheets (Taken 11/15/2024 2237)  Supportive Measures:   active listening utilized   verbalization of feelings encouraged  Goal: Develops/Participates in Therapeutic [place] to Support Successful Transition  Outcome: Not Progressing  Flowsheets (Taken 11/15/2024 2237)  Develops/Participates in Therapeutic [place] to Support Successful Transition: unable to achieve  outcome  Intervention: Foster Therapeutic [place]  Flowsheets (Taken 11/15/2024 2237)  Trust Relationship/Rapport:   care explained   thoughts/feelings acknowledged  Intervention: Mutually Develop Transition Plan  Flowsheets (Taken 11/15/2024 2237)  Current Discharge Risk:   psychiatric illness   substance use/abuse  Outpatient/Agency/Support Group Needs:   outpatient counseling   outpatient medication management  Anticipated Discharge Disposition: home or self-care  Patient/Family Anticipated Services at Transition:   outpatient care   mental health services  Concerns to be Addressed:   medication   mental health   substance/tobacco abuse/use  Goal: Rounds/Family Conference  Outcome: Not Progressing  Flowsheets (Taken 11/15/2024 2237)  Participants:   milieu/psych techs   nursing     Problem: Psychotic Signs/Symptoms  Goal: Improved Behavioral Control (Psychotic Signs/Symptoms)  Outcome: Not Progressing  Intervention: Manage Behavior  Flowsheets (Taken 11/15/2024 2237)  De-Escalation Techniques:   quiet time facilitated   medication administered  Goal: Optimal Cognitive Function (Psychotic Signs/Symptoms)  Outcome: Not Progressing  Intervention: Support and Promote Cognitive Ability  Flowsheets (Taken 11/15/2024 2237)  Trust Relationship/Rapport:   care explained   thoughts/feelings acknowledged  Goal: Increased Participation and Engagement (Psychotic Signs/Symptoms)  Outcome: Not Progressing  Intervention: Facilitate Participation and Engagement  Flowsheets (Taken 11/15/2024 2237)  Supportive Measures:   active listening utilized   verbalization of feelings encouraged  Diversional Activity: television  Goal: Improved Mood Symptoms (Psychotic Signs/Symptoms)  Outcome: Not Progressing  Intervention: Optimize Emotion and Mood  Flowsheets (Taken 11/15/2024 2237)  Supportive Measures:   active listening utilized   verbalization of feelings encouraged  Diversional Activity: television  Goal: Improved Psychomotor  Symptoms (Psychotic Signs/Symptoms)  Outcome: Not Progressing  Flowsheets (Taken 11/15/2024 2237)  Mutually Determined Action Steps (Improved Psychomotor Symptoms): adheres to medication regimen  Intervention: Manage Psychomotor Movement  Flowsheets (Taken 11/15/2024 2237)  Activity (Behavioral Health): up ad tirso  Diversional Activity: television  Goal: Decreased Sensory Symptoms (Psychotic Signs/Symptoms)  Outcome: Not Progressing  Flowsheets (Taken 11/15/2024 2237)  Mutually Determined Action Steps (Decreased Sensory Symptoms): adheres to medication regimen  Intervention: Minimize and Manage Sensory Impairment  Flowsheets (Taken 11/15/2024 2237)  Sensory Stimulation Regulation: quiet environment promoted  Goal: Improved Sleep (Psychotic Signs/Symptoms)  Outcome: Not Progressing  Flowsheets (Taken 11/15/2024 2237)  Mutually Determined Action Steps (Improved Sleep): sleeps 4-6 hours at night  Intervention: Promote Healthy Sleep Hygiene  Flowsheets (Taken 11/15/2024 2237)  Sleep Hygiene Promotion:   awakenings minimized   regular sleep pattern promoted  Goal: Enhanced Social, Occupational or Functional Skills (Psychotic Signs/Symptoms)  Outcome: Not Progressing  Intervention: Promote Social, Occupational and Functional Ability  Flowsheets (Taken 11/15/2024 2237)  Trust Relationship/Rapport:   care explained   thoughts/feelings acknowledged  Social Functional Ability Promotion:   autonomy promoted   self-expression encouraged     Problem: Excessive Substance Use  Goal: Optimized Energy Level (Excessive Substance Use)  Outcome: Not Progressing  Flowsheets (Taken 11/15/2024 2237)  Mutually Determined Action Steps (Optimized Energy Level): grooms self without prompting  Intervention: Optimize Energy Level  Flowsheets (Taken 11/15/2024 2237)  Activity (Behavioral Health): up ad tirso  Diversional Activity: television  Goal: Improved Behavioral Control (Excessive Substance Use)  Outcome: Not Progressing  Intervention:  Promote Behavior and Impulse Control  Flowsheets (Taken 11/15/2024 2237)  Behavior Management: behavioral plan reviewed  Goal: Increased Participation and Engagement (Excessive Substance Use)  Outcome: Not Progressing  Intervention: Facilitate Participation and Engagement  Flowsheets (Taken 11/15/2024 2237)  Supportive Measures:   active listening utilized   verbalization of feelings encouraged  Diversional Activity: television  Goal: Improved Physiologic Symptoms (Excessive Substance Use)  Outcome: Not Progressing  Intervention: Optimize Physiologic Function  Flowsheets (Taken 11/15/2024 2237)  Oral Nutrition Promotion:   social interaction promoted   rest periods promoted  Nutrition Interventions: food preferences provided  Goal: Enhanced Social, Occupational or Functional Skills (Excessive Substance Use)  Outcome: Not Progressing  Intervention: Promote Social, Occupational and Functional Ability  Flowsheets (Taken 11/15/2024 2237)  Trust Relationship/Rapport:   care explained   thoughts/feelings acknowledged  Social Functional Ability Promotion:   autonomy promoted   self-expression encouraged   AAOx4. Flat affect. Anxious and depressed mood. Denies suicidal/homicidal ideations. Endorses auditory and visual hallucinations. Endorses feeling like people are talking about him, even though he doesn't see anyone talking about him. Reports detox symptoms, nausea, diarrhea. CIWA 5 COWS 4. Compliant with medications, request Atarax 50 mg po for anxiety and Trazodone 100 mg po for sleep. Reports that he is eating and sleeping well. Request Trazodone to help sleep since he in in a new environment. No agitation or aggression noted at this time. Continue with plan of care and q 15 minute safety checks.

## 2024-11-16 NOTE — NURSING
2001 Pt requested PRN sleep and anxiety medication. Administered trazodone 100 mg po and atarax 50 mg po.     2200 Pt in bed, eyes closed, resting quietly. No signs of anxiety noted.

## 2024-11-17 PROCEDURE — 25000003 PHARM REV CODE 250: Performed by: PSYCHIATRY & NEUROLOGY

## 2024-11-17 PROCEDURE — 12400001 HC PSYCH SEMI-PRIVATE ROOM

## 2024-11-17 PROCEDURE — 25000003 PHARM REV CODE 250: Performed by: PEDIATRICS

## 2024-11-17 PROCEDURE — 25000003 PHARM REV CODE 250

## 2024-11-17 PROCEDURE — S4991 NICOTINE PATCH NONLEGEND: HCPCS | Performed by: PSYCHIATRY & NEUROLOGY

## 2024-11-17 RX ADMIN — TRAZODONE HYDROCHLORIDE 100 MG: 100 TABLET ORAL at 08:11

## 2024-11-17 RX ADMIN — CEPHALEXIN 500 MG: 500 CAPSULE ORAL at 08:11

## 2024-11-17 RX ADMIN — ACETAMINOPHEN 325MG 650 MG: 325 TABLET ORAL at 08:11

## 2024-11-17 RX ADMIN — CEPHALEXIN 500 MG: 500 CAPSULE ORAL at 04:11

## 2024-11-17 RX ADMIN — HYDROXYZINE HYDROCHLORIDE 50 MG: 50 TABLET, FILM COATED ORAL at 08:11

## 2024-11-17 RX ADMIN — NICOTINE 1 PATCH: 21 PATCH, EXTENDED RELEASE TRANSDERMAL at 08:11

## 2024-11-17 RX ADMIN — LORAZEPAM 1 MG: 1 TABLET ORAL at 08:11

## 2024-11-17 RX ADMIN — CEPHALEXIN 500 MG: 500 CAPSULE ORAL at 12:11

## 2024-11-17 RX ADMIN — SERTRALINE HYDROCHLORIDE 50 MG: 50 TABLET ORAL at 08:11

## 2024-11-17 RX ADMIN — TAMSULOSIN HYDROCHLORIDE 0.4 MG: 0.4 CAPSULE ORAL at 08:11

## 2024-11-17 RX ADMIN — LORAZEPAM 1 MG: 1 TABLET ORAL at 02:11

## 2024-11-17 NOTE — PROGRESS NOTES
11/17/24 1425   [facility] Group Therapy   Group Name Community Reintegration   Specific Interventions Relapse Prevention   Participation Level None   Participation Quality Sleeping;Other (see comments)  (didn't attend)

## 2024-11-17 NOTE — PLAN OF CARE
Problem: Psychotic Signs/Symptoms  Goal: Improved Behavioral Control (Psychotic Signs/Symptoms)  Outcome: Progressing  Flowsheets (Taken 11/17/2024 0939)  Mutually Determined Action Steps (Improved Behavioral Control): identifies symptoms triggers  Intervention: Manage Behavior  Flowsheets (Taken 11/17/2024 0939)  De-Escalation Techniques: quiet time facilitated  Goal: Optimal Cognitive Function (Psychotic Signs/Symptoms)  Outcome: Progressing  Flowsheets (Taken 11/17/2024 0939)  Mutually Determined Action Steps (Optimal Cognitive Function): participates in attention training  Intervention: Support and Promote Cognitive Ability  Flowsheets (Taken 11/17/2024 0939)  Trust Relationship/Rapport:   care explained   thoughts/feelings acknowledged  Goal: Increased Participation and Engagement (Psychotic Signs/Symptoms)  Outcome: Progressing  Flowsheets (Taken 11/17/2024 0939)  Mutually Determined Action Steps (Increased Participation and Engagement): identifies symptoms triggers  Intervention: Facilitate Participation and Engagement  Flowsheets (Taken 11/17/2024 0939)  Supportive Measures:   active listening utilized   self-care encouraged  Diversional Activity: art work  Goal: Improved Mood Symptoms (Psychotic Signs/Symptoms)  Outcome: Progressing  Flowsheets (Taken 11/17/2024 0939)  Mutually Determined Action Steps (Improved Mood Symptoms): acknowledges progress  Intervention: Optimize Emotion and Mood  Flowsheets (Taken 11/17/2024 0939)  Supportive Measures:   active listening utilized   self-care encouraged  Diversional Activity: art work  Goal: Improved Psychomotor Symptoms (Psychotic Signs/Symptoms)  Outcome: Progressing  Flowsheets (Taken 11/17/2024 0939)  Mutually Determined Action Steps (Improved Psychomotor Symptoms): adheres to medication regimen  Intervention: Manage Psychomotor Movement  Flowsheets (Taken 11/17/2024 0939)  Activity (Behavioral Health): up ad tirso  Diversional Activity: art work     Problem:  "Psychotic Signs/Symptoms  Goal: Decreased Sensory Symptoms (Psychotic Signs/Symptoms)  Flowsheets (Taken 11/17/2024 0939)  Mutually Determined Action Steps (Decreased Sensory Symptoms): adheres to medication regimen  Intervention: Minimize and Manage Sensory Impairment  Flowsheets (Taken 11/17/2024 0939)  Sensory Stimulation Regulation: quiet environment promoted       AAOx4. Unkempt appearance with body odor. Speech with normal rate and tone. Steady gait. Flat affect, anxious and depressed mood. Denies suicidal ideations. Denies homicidal ideations.  Patient says the voices are getting "less" this morning. He does intermittently talks to self.. Interacting well with staff and peers. Attended group today. Fair eye contact. States that he is eating and sleeping well. Compliant with medications.. No agitation or aggression noted. Continue with plan of care and q 15 minute safety checks this shift.   "

## 2024-11-17 NOTE — NURSING
2011 pt c/o anxiety and restlessness. Administered PRN atarax and trazodone at this time.     2200 pt noted to be resting quietly with eyes closed. Respirations even/unlabored.

## 2024-11-17 NOTE — PLAN OF CARE
Problem: Adult Behavioral Health Plan of Care  Goal: Plan of Care Review  Outcome: Not Progressing  Flowsheets (Taken 11/16/2024 2055)  Patient Agreement with Plan of Care: agrees  Plan of Care Reviewed With: patient  Goal: Patient-Specific Goal (Individualization)  Outcome: Not Progressing  Flowsheets (Taken 11/16/2024 2055)  Patient Personal Strengths: independent living skills  Patient Vulnerabilities: substance abuse/addiction  Goal: Adheres to Safety Considerations for Self and Others  Outcome: Not Progressing  Flowsheets (Taken 11/16/2024 2055)  Adheres to Safety Considerations for Self and Others: unable to achieve outcome  Intervention: Develop and Maintain Individualized Safety Plan  Flowsheets (Taken 11/16/2024 2055)  Safety Measures:   safety rounds completed   monitored by video  Goal: Absence of New-Onset Illness or Injury  Outcome: Not Progressing  Intervention: Identify and Manage Fall Risk  Flowsheets (Taken 11/16/2024 2055)  Safety Measures:   safety rounds completed   monitored by video  Intervention: Prevent VTE (Venous Thromboembolism)  Flowsheets (Taken 11/16/2024 2055)  VTE Prevention/Management:   ambulation promoted   fluids promoted  Intervention: Prevent Infection  Flowsheets (Taken 11/16/2024 2055)  Infection Prevention:   hand hygiene promoted   rest/sleep promoted  Goal: Optimized Coping Skills in Response to Life Stressors  Outcome: Not Progressing  Flowsheets (Taken 11/16/2024 2055)  Optimized Coping Skills in Response to Life Stressors: unable to achieve outcome  Intervention: Promote Effective Coping Strategies  Flowsheets (Taken 11/16/2024 2055)  Supportive Measures:   active listening utilized   self-care encouraged  Goal: Develops/Participates in Therapeutic [place] to Support Successful Transition  Outcome: Not Progressing  Flowsheets (Taken 11/16/2024 2055)  Develops/Participates in Therapeutic [place] to Support Successful Transition: unable to achieve  outcome  Intervention: Foster Therapeutic [place]  Flowsheets (Taken 11/16/2024 2055)  Trust Relationship/Rapport:   care explained   thoughts/feelings acknowledged  Intervention: Mutually Develop Transition Plan  Flowsheets (Taken 11/16/2024 2055)  Outpatient/Agency/Support Group Needs:   outpatient medication management   outpatient counseling  Anticipated Discharge Disposition: home or self-care  Patient/Family Anticipated Services at Transition:   outpatient care   mental health services  Patient/Family Anticipates Transition to: home  Concerns to be Addressed:   medication   mental health   substance/tobacco abuse/use  Goal: Rounds/Family Conference  Outcome: Not Progressing  Flowsheets (Taken 11/16/2024 2055)  Participants:   milieu/psych techs   nursing     Problem: Psychotic Signs/Symptoms  Goal: Improved Behavioral Control (Psychotic Signs/Symptoms)  Outcome: Not Progressing  Intervention: Manage Behavior  Flowsheets (Taken 11/16/2024 2055)  De-Escalation Techniques:   medication offered   quiet time facilitated  Goal: Optimal Cognitive Function (Psychotic Signs/Symptoms)  Outcome: Not Progressing  Intervention: Support and Promote Cognitive Ability  Flowsheets (Taken 11/16/2024 2055)  Trust Relationship/Rapport:   care explained   thoughts/feelings acknowledged  Goal: Increased Participation and Engagement (Psychotic Signs/Symptoms)  Outcome: Not Progressing  Intervention: Facilitate Participation and Engagement  Flowsheets (Taken 11/16/2024 2055)  Supportive Measures:   active listening utilized   self-care encouraged  Goal: Improved Mood Symptoms (Psychotic Signs/Symptoms)  Outcome: Not Progressing  Intervention: Optimize Emotion and Mood  Flowsheets (Taken 11/16/2024 2055)  Supportive Measures:   active listening utilized   self-care encouraged  Goal: Improved Psychomotor Symptoms (Psychotic Signs/Symptoms)  Outcome: Not Progressing  Flowsheets (Taken 11/16/2024 2055)  Mutually Determined Action Steps  (Improved Psychomotor Symptoms): adheres to medication regimen  Intervention: Manage Psychomotor Movement  Flowsheets (Taken 11/16/2024 2055)  Activity (Behavioral Health): up ad tirso  Goal: Decreased Sensory Symptoms (Psychotic Signs/Symptoms)  Outcome: Not Progressing  Flowsheets (Taken 11/16/2024 2055)  Mutually Determined Action Steps (Decreased Sensory Symptoms): adheres to medication regimen  Intervention: Minimize and Manage Sensory Impairment  Flowsheets (Taken 11/16/2024 2055)  Sensory Stimulation Regulation: quiet environment promoted  Goal: Improved Sleep (Psychotic Signs/Symptoms)  Outcome: Not Progressing  Flowsheets (Taken 11/16/2024 2055)  Mutually Determined Action Steps (Improved Sleep): sleeps 4-6 hours at night  Intervention: Promote Healthy Sleep Hygiene  Flowsheets (Taken 11/16/2024 2055)  Sleep Hygiene Promotion:   awakenings minimized   regular sleep pattern promoted  Goal: Enhanced Social, Occupational or Functional Skills (Psychotic Signs/Symptoms)  Outcome: Not Progressing  Intervention: Promote Social, Occupational and Functional Ability  Flowsheets (Taken 11/16/2024 2055)  Trust Relationship/Rapport:   care explained   thoughts/feelings acknowledged  Social Functional Ability Promotion:   autonomy promoted   self-expression encouraged     Problem: Excessive Substance Use  Goal: Optimized Energy Level (Excessive Substance Use)  Outcome: Not Progressing  Flowsheets (Taken 11/16/2024 2055)  Mutually Determined Action Steps (Optimized Energy Level): grooms self without prompting  Intervention: Optimize Energy Level  Flowsheets (Taken 11/16/2024 2055)  Activity (Behavioral Health): up ad tirso  Goal: Improved Behavioral Control (Excessive Substance Use)  Outcome: Not Progressing  Intervention: Promote Behavior and Impulse Control  Flowsheets (Taken 11/16/2024 2055)  Behavior Management: behavioral plan reviewed  Goal: Increased Participation and Engagement (Excessive Substance Use)  Outcome: Not  Progressing  Intervention: Facilitate Participation and Engagement  Flowsheets (Taken 11/16/2024 2055)  Supportive Measures:   active listening utilized   self-care encouraged  Goal: Improved Physiologic Symptoms (Excessive Substance Use)  Outcome: Not Progressing  Intervention: Optimize Physiologic Function  Flowsheets (Taken 11/16/2024 2055)  Oral Nutrition Promotion:   social interaction promoted   rest periods promoted  Nutrition Interventions: food preferences provided  Goal: Enhanced Social, Occupational or Functional Skills (Excessive Substance Use)  Outcome: Not Progressing  Intervention: Promote Social, Occupational and Functional Ability  Flowsheets (Taken 11/16/2024 2055)  Trust Relationship/Rapport:   care explained   thoughts/feelings acknowledged  Social Functional Ability Promotion:   autonomy promoted   self-expression encouraged   AAOx4. Flat affect. Anxious and depressed mood. Withdrawn and isolative. Minimal interaction with staff and peers. Denies suicidal/homicidal ideations. Endorses auditory hallucinations and states that he hears people talking about him. Unkempt appearance, fair eye contact. Normal speech, rate and tone. Steady gait. Compliant with medications. Atarax 50 mg po given for anxiety. Trazodone 100 mg po given for sleep. Reports fair sleep and anxiety.  CIWA 2. COWS 2. No agitation or aggression noted. Continue with plan of care and q 15 minute safety checks.

## 2024-11-18 PROCEDURE — S4991 NICOTINE PATCH NONLEGEND: HCPCS | Performed by: PSYCHIATRY & NEUROLOGY

## 2024-11-18 PROCEDURE — 25000003 PHARM REV CODE 250: Performed by: PEDIATRICS

## 2024-11-18 PROCEDURE — 12400001 HC PSYCH SEMI-PRIVATE ROOM

## 2024-11-18 PROCEDURE — 25000003 PHARM REV CODE 250

## 2024-11-18 PROCEDURE — 25000003 PHARM REV CODE 250: Performed by: PSYCHIATRY & NEUROLOGY

## 2024-11-18 RX ORDER — MUPIROCIN 20 MG/G
OINTMENT TOPICAL 2 TIMES DAILY
Status: DISCONTINUED | OUTPATIENT
Start: 2024-11-18 | End: 2024-11-18

## 2024-11-18 RX ORDER — SERTRALINE HYDROCHLORIDE 50 MG/1
100 TABLET, FILM COATED ORAL DAILY
Status: DISCONTINUED | OUTPATIENT
Start: 2024-11-19 | End: 2024-11-23 | Stop reason: HOSPADM

## 2024-11-18 RX ORDER — ARIPIPRAZOLE 5 MG/1
5 TABLET ORAL DAILY
Status: DISCONTINUED | OUTPATIENT
Start: 2024-11-18 | End: 2024-11-20

## 2024-11-18 RX ADMIN — CEPHALEXIN 500 MG: 500 CAPSULE ORAL at 09:11

## 2024-11-18 RX ADMIN — LORAZEPAM 1 MG: 1 TABLET ORAL at 09:11

## 2024-11-18 RX ADMIN — TAMSULOSIN HYDROCHLORIDE 0.4 MG: 0.4 CAPSULE ORAL at 09:11

## 2024-11-18 RX ADMIN — TRAZODONE HYDROCHLORIDE 100 MG: 100 TABLET ORAL at 08:11

## 2024-11-18 RX ADMIN — HYDROXYZINE HYDROCHLORIDE 50 MG: 50 TABLET, FILM COATED ORAL at 08:11

## 2024-11-18 RX ADMIN — LORAZEPAM 1 MG: 1 TABLET ORAL at 03:11

## 2024-11-18 RX ADMIN — LORAZEPAM 1 MG: 1 TABLET ORAL at 08:11

## 2024-11-18 RX ADMIN — SERTRALINE HYDROCHLORIDE 50 MG: 50 TABLET ORAL at 09:11

## 2024-11-18 RX ADMIN — ARIPIPRAZOLE 5 MG: 5 TABLET ORAL at 03:11

## 2024-11-18 RX ADMIN — NICOTINE 1 PATCH: 21 PATCH, EXTENDED RELEASE TRANSDERMAL at 09:11

## 2024-11-18 NOTE — PLAN OF CARE
Problem: Adult Behavioral Health Plan of Care  Goal: Plan of Care Review  Outcome: Progressing  Flowsheets (Taken 11/18/2024 0210)  Patient Agreement with Plan of Care: agrees  Plan of Care Reviewed With: patient  Goal: Patient-Specific Goal (Individualization)  Outcome: Progressing  Flowsheets (Taken 11/18/2024 0210)  Patient Personal Strengths:   no history of violence   independent living skills  Patient Vulnerabilities: substance abuse/addiction  Goal: Adheres to Safety Considerations for Self and Others  Outcome: Progressing  Flowsheets (Taken 11/18/2024 0210)  Adheres to Safety Considerations for Self and Others: making progress toward outcome  Intervention: Develop and Maintain Individualized Safety Plan  Flowsheets (Taken 11/18/2024 0210)  Safety Measures:   monitored by video   safety rounds completed  Goal: Absence of New-Onset Illness or Injury  Outcome: Progressing  Intervention: Identify and Manage Fall Risk  Flowsheets (Taken 11/18/2024 0210)  Safety Measures:   monitored by video   safety rounds completed  Intervention: Prevent VTE (Venous Thromboembolism)  Flowsheets (Taken 11/18/2024 0210)  VTE Prevention/Management:   ambulation promoted   fluids promoted  Intervention: Prevent Infection  Flowsheets (Taken 11/18/2024 0210)  Infection Prevention:   rest/sleep promoted   hand hygiene promoted  Goal: Optimized Coping Skills in Response to Life Stressors  Outcome: Progressing  Flowsheets (Taken 11/18/2024 0210)  Optimized Coping Skills in Response to Life Stressors: making progress toward outcome  Intervention: Promote Effective Coping Strategies  Flowsheets (Taken 11/18/2024 0210)  Supportive Measures:   active listening utilized   verbalization of feelings encouraged  Goal: Develops/Participates in Therapeutic [place] to Support Successful Transition  Outcome: Progressing  Flowsheets (Taken 11/18/2024 0210)  Develops/Participates in Therapeutic [place] to Support Successful Transition: making  progress toward outcome  Intervention: Foster Therapeutic [place]  Flowsheets (Taken 11/18/2024 0210)  Trust Relationship/Rapport:   care explained   thoughts/feelings acknowledged  Intervention: Mutually Develop Transition Plan  Flowsheets (Taken 11/18/2024 0210)  Current Discharge Risk:   psychiatric illness   substance use/abuse  Outpatient/Agency/Support Group Needs:   outpatient counseling   outpatient medication management  Anticipated Discharge Disposition: home or self-care  Patient/Family Anticipated Services at Transition:   outpatient care   mental health services  Patient/Family Anticipates Transition to: home  Concerns to be Addressed:   medication   mental health   substance/tobacco abuse/use  Goal: Rounds/Family Conference  Outcome: Progressing  Flowsheets (Taken 11/18/2024 0210)  Participants:   milieu/psych techs   nursing     Problem: Psychotic Signs/Symptoms  Goal: Improved Behavioral Control (Psychotic Signs/Symptoms)  Outcome: Progressing  Intervention: Manage Behavior  Flowsheets (Taken 11/18/2024 0210)  De-Escalation Techniques:   medication offered   quiet time facilitated  Goal: Optimal Cognitive Function (Psychotic Signs/Symptoms)  Outcome: Progressing  Intervention: Support and Promote Cognitive Ability  Flowsheets (Taken 11/18/2024 0210)  Trust Relationship/Rapport:   care explained   thoughts/feelings acknowledged  Goal: Increased Participation and Engagement (Psychotic Signs/Symptoms)  Outcome: Progressing  Flowsheets (Taken 11/18/2024 0210)  Mutually Determined Action Steps (Increased Participation and Engagement): identifies symptoms triggers  Intervention: Facilitate Participation and Engagement  Flowsheets (Taken 11/18/2024 0210)  Supportive Measures:   active listening utilized   verbalization of feelings encouraged  Goal: Improved Mood Symptoms (Psychotic Signs/Symptoms)  Outcome: Progressing  Flowsheets (Taken 11/18/2024 0210)  Mutually Determined Action Steps (Improved Mood  Symptoms):   acknowledges progress   verbalizes increased insight  Intervention: Optimize Emotion and Mood  Flowsheets (Taken 11/18/2024 0210)  Supportive Measures:   active listening utilized   verbalization of feelings encouraged  Goal: Improved Psychomotor Symptoms (Psychotic Signs/Symptoms)  Outcome: Progressing  Flowsheets (Taken 11/18/2024 0210)  Mutually Determined Action Steps (Improved Psychomotor Symptoms): adheres to medication regimen  Intervention: Manage Psychomotor Movement  Flowsheets (Taken 11/18/2024 0210)  Activity (Behavioral Health): up ad tirso  Goal: Decreased Sensory Symptoms (Psychotic Signs/Symptoms)  Outcome: Progressing  Flowsheets (Taken 11/18/2024 0210)  Mutually Determined Action Steps (Decreased Sensory Symptoms): adheres to medication regimen  Intervention: Minimize and Manage Sensory Impairment  Flowsheets (Taken 11/18/2024 0210)  Sensory Stimulation Regulation: quiet environment promoted  Goal: Improved Sleep (Psychotic Signs/Symptoms)  Outcome: Progressing  Flowsheets (Taken 11/18/2024 0210)  Mutually Determined Action Steps (Improved Sleep): sleeps 4-6 hours at night  Intervention: Promote Healthy Sleep Hygiene  Flowsheets (Taken 11/18/2024 0210)  Sleep Hygiene Promotion:   awakenings minimized   regular sleep pattern promoted  Goal: Enhanced Social, Occupational or Functional Skills (Psychotic Signs/Symptoms)  Outcome: Progressing  Intervention: Promote Social, Occupational and Functional Ability  Flowsheets (Taken 11/18/2024 0210)  Trust Relationship/Rapport:   care explained   thoughts/feelings acknowledged  Social Functional Ability Promotion:   autonomy promoted   self-expression encouraged     Problem: Excessive Substance Use  Goal: Optimized Energy Level (Excessive Substance Use)  Outcome: Progressing  Flowsheets (Taken 11/18/2024 0210)  Mutually Determined Action Steps (Optimized Energy Level): grooms self without prompting  Intervention: Optimize Energy Level  Flowsheets  (Taken 11/18/2024 0210)  Activity (Behavioral Health): up ad tirso  Goal: Improved Behavioral Control (Excessive Substance Use)  Outcome: Progressing  Flowsheets (Taken 11/18/2024 0210)  Mutually Determined Action Steps (Improved Behavioral Control): identifies major stressors  Intervention: Promote Behavior and Impulse Control  Flowsheets (Taken 11/18/2024 0210)  Behavior Management: behavioral plan reviewed  Goal: Increased Participation and Engagement (Excessive Substance Use)  Outcome: Progressing  Intervention: Facilitate Participation and Engagement  Flowsheets (Taken 11/18/2024 0210)  Supportive Measures:   active listening utilized   verbalization of feelings encouraged  Goal: Improved Physiologic Symptoms (Excessive Substance Use)  Outcome: Progressing  Intervention: Optimize Physiologic Function  Flowsheets (Taken 11/18/2024 0210)  Oral Nutrition Promotion:   rest periods promoted   social interaction promoted  Nutrition Interventions: food preferences provided  Goal: Enhanced Social, Occupational or Functional Skills (Excessive Substance Use)  Outcome: Progressing  Intervention: Promote Social, Occupational and Functional Ability  Flowsheets (Taken 11/18/2024 0210)  Trust Relationship/Rapport:   care explained   thoughts/feelings acknowledged  Social Functional Ability Promotion:   autonomy promoted   self-expression encouraged   AAOx4. Flat affect. Anxious and depressed mood. Withdrawn and isolative. Minimal interaction with staff and peers. Did not attend group today. Denies suicidal/homicidal ideations. Endorses auditory hallucinations and states that he hears people talking about him. Unkempt appearance, fair eye contact. Normal speech, rate and tone. Steady gait. Compliant with medications. Atarax 50 mg po given for anxiety. Trazodone 100 mg po given for sleep. Tylenol 650 mg po for c/o headache. Reports fair sleep and anxiety.  CIWA 3. COWS 2. No agitation or aggression noted. Continue with plan of  care and q 15 minute safety checks.

## 2024-11-18 NOTE — NURSING
Patient came to MD visit with paper scrubs, not taking care of his ADL's, malodorous.  He had adequate eye contact.  His mood he says is better.  He has been isolating in his room, but comes out for meals and MD visits.  He says he is no longer having detox symptoms. He does endorse the  has always been there.  Medications discussed with patient/MD.  Is open to trying medications for the voices.

## 2024-11-18 NOTE — NURSING
2023 pt c/o anxiety, restlessness and mild tooth pain and headache rated 3/10. Administered PRN atarax, trazodone and APAP at this time.     2200 pt noted to be resting quietly in bed with eyes closed. Respirations even/unlabored. No signs of distress or discomfort noted at this time.

## 2024-11-18 NOTE — PROGRESS NOTES
11/18/24 1000   [facility] Group Therapy   Group Name Therapeutic Recreation   Specific Interventions Skilled Activity Leisure Education and Awareness   Participation Level None   Participation Quality Refused  (despite encouragment)

## 2024-11-18 NOTE — PROGRESS NOTES
"11/18/2024  Cesar Macedo   1990   [id number]        Psychiatry Progress Note     Chief Complaint: "Depressed still"    SUBJECTIVE:   Cesar Macedo is a 34 y.o. male placed under a PEC at [facility] due to paranoia and auditory hallucinations following methamphetamine use approximately three hours before his arrival at the ED. He has a documented history of substance abuse and reported in the ED that he heard "people that want him to die" and "conversations about himself."     This morning, reports that he has been depressed.  No tremors, diaphoresis, diarrhea, or other withdrawal-related symptoms noted.  Still reporting auditory hallucinations.  He has never been on Abilify previously but amenable to a trial.  Will start this today and will monitor progress.      UDS: (+)amphetamines, cannabis  Blood alcohol: <10    Current Medications:   Scheduled Meds:    [START ON 11/19/2024] LORazepam  0.5 mg Oral TID    LORazepam  1 mg Oral TID    nicotine  1 patch Transdermal Daily    sertraline  50 mg Oral Daily    tamsulosin  0.4 mg Oral Daily      PRN Meds:   Current Facility-Administered Medications:     acetaminophen, 650 mg, Oral, Q6H PRN    aluminum-magnesium hydroxide-simethicone, 30 mL, Oral, Q6H PRN    cloNIDine, 0.1 mg, Oral, Q8H PRN    cloNIDine, 0.2 mg, Oral, Q8H PRN    haloperidoL, 10 mg, Oral, Q4H PRN **AND** diphenhydrAMINE, 50 mg, Oral, Q4H PRN **AND** LORazepam, 2 mg, Oral, Q4H PRN **AND** haloperidol lactate, 10 mg, Intramuscular, Q4H PRN **AND** diphenhydrAMINE, 50 mg, Intramuscular, Q4H PRN **AND** lorazepam, 2 mg, Intramuscular, Q4H PRN    hydrOXYzine HCL, 50 mg, Oral, Q4H PRN    traZODone, 100 mg, Oral, Nightly PRN   Psychotherapeutics (From admission, onward)      Start     Stop Route Frequency Ordered    11/19/24 0900  LORazepam tablet 0.5 mg         11/21/24 0859 Oral 3 times daily 11/15/24 1001    11/17/24 0900  LORazepam tablet 1 mg         11/19/24 0859 Oral 3 times daily 11/15/24 1001    11/16/24 0000 " " haloperidoL tablet 10 mg  (Med - Acute  Behavioral Management)        Placed in "And" Linked Group    -- Oral Every 4 hours PRN 11/15/24 0137    11/16/24 0000  LORazepam tablet 2 mg  (Med - Acute  Behavioral Management)        Placed in "And" Linked Group    -- Oral Every 4 hours PRN 11/15/24 0137    11/16/24 0000  haloperidol lactate injection 10 mg  (Med - Acute  Behavioral Management)        Placed in "And" Linked Group    -- IM Every 4 hours PRN 11/15/24 0137    11/16/24 0000  LORazepam injection 2 mg  (Med - Acute  Behavioral Management)        Placed in "And" Linked Group    -- IM Every 4 hours PRN 11/15/24 0137    11/15/24 1015  LORazepam tablet 2 mg         11/17/24 0859 Oral 3 times daily 11/15/24 1001    11/15/24 1000  sertraline tablet 50 mg         -- Oral Daily 11/15/24 0958    11/15/24 0137  traZODone tablet 100 mg         -- Oral Nightly PRN 11/15/24 0137            Allergies:   Review of patient's allergies indicates:   Allergen Reactions    Fish containing products Swelling        OBJECTIVE:   Vitals   Vitals:    11/18/24 0701   BP: 134/80   Pulse: 64   Resp: 18   Temp: 97.7 °F (36.5 °C)        Labs/Imaging/Studies:   No results found for this or any previous visit (from the past 36 hours).       Medical Review Of Systems:  Constitutional: negative  Respiratory: negative  Cardiovascular: negative  Gastrointestinal: negative  Genitourinary:negative  Musculoskeletal:negative  Neurological: negative       Psychiatric Mental Status Exam:  General Appearance: appears stated age, well-developed, well-nourished  Arousal: alert  Behavior: cooperative  Movements and Motor Activity: no abnormal involuntary movements noted  Orientation: oriented to person, place, time, and situation  Speech: normal rate, normal rhythm, normal volume, normal tone  Mood: "Ok"  Affect: constricted  Thought Process: linear  Associations: intact  Thought Content and Perceptions: (+)auditory hallucinations, no suicidal ideation, no " homicidal ideation  Recent and Remote Memory: recent memory intact, remote memory intact; per interview/observation with patient  Attention and Concentration: intact, attentive to conversation; per interview/observation with patient  Fund of Knowledge: intact, aware of current events, vocabulary appropriate; based on history, vocabulary, fund of knowledge, syntax, grammar, and content  Insight: questionable; based on understanding of severity of illness and HPI  Judgment: questionable; based on patient's behavior and HPI     ASSESSMENT/PLAN:   Problems Addressed/Diagnoses:  Major Depressive Disorder, recurrent, severe (F33.2)   Psychotic disorder F(29)  Anxiety Disorder (F41.9)  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     Plan:  Depression, chronic with acute exacerbation  -Increase Zoloft to 100mg daily  -Abilify 5mg daily    Psychosis, chronic with acute exacerbation  -Abilify 5mg daily    Anxiety, chronic with acute exacerbation  -Increase Zoloft to 100mg daily    Amphetamine use, chronic with acute exacerbation  -Group/Individual psychotherapy    Cannabis use, chronic with acute exacerbation  -Group/Individual psychotherapy     Expected Disposition Plan: Substance treatment program        Huey Pineda M.D.

## 2024-11-18 NOTE — PROGRESS NOTES
11/18/24 1500   [facility] Group Therapy   Group Name Therapeutic Recreation   Specific Interventions Skilled Activity Leisure Education and Awareness   Participation Level None   Participation Quality Refused  (despite encouragment)

## 2024-11-18 NOTE — PLAN OF CARE
Problem: Psychotic Signs/Symptoms  Goal: Improved Behavioral Control (Psychotic Signs/Symptoms)  Outcome: Progressing  Flowsheets (Taken 11/18/2024 1212)  Mutually Determined Action Steps (Improved Behavioral Control): identifies symptoms triggers  Intervention: Manage Behavior  Flowsheets (Taken 11/18/2024 1212)  De-Escalation Techniques:   physical activity promoted   quiet time facilitated  Goal: Optimal Cognitive Function (Psychotic Signs/Symptoms)  Outcome: Progressing  Flowsheets (Taken 11/18/2024 1212)  Mutually Determined Action Steps (Optimal Cognitive Function): participates in attention training  Intervention: Support and Promote Cognitive Ability  Flowsheets (Taken 11/18/2024 1212)  Trust Relationship/Rapport:   care explained   thoughts/feelings acknowledged  Goal: Improved Mood Symptoms (Psychotic Signs/Symptoms)  Outcome: Progressing  Flowsheets (Taken 11/18/2024 1212)  Mutually Determined Action Steps (Improved Mood Symptoms):   acknowledges progress   verbalizes increased insight  Intervention: Optimize Emotion and Mood  Flowsheets (Taken 11/18/2024 1212)  Supportive Measures:   active listening utilized   verbalization of feelings encouraged  Diversional Activity: television  Goal: Improved Psychomotor Symptoms (Psychotic Signs/Symptoms)  Outcome: Progressing  Flowsheets (Taken 11/18/2024 1212)  Mutually Determined Action Steps (Improved Psychomotor Symptoms): adheres to medication regimen  Intervention: Manage Psychomotor Movement  Flowsheets (Taken 11/18/2024 1212)  Activity (Behavioral Health): up ad tirso  Diversional Activity: television     Problem: Psychotic Signs/Symptoms  Goal: Increased Participation and Engagement (Psychotic Signs/Symptoms)  Outcome: Not Progressing  Flowsheets (Taken 11/18/2024 1212)  Mutually Determined Action Steps (Increased Participation and Engagement): identifies symptoms triggers  Intervention: Facilitate Participation and Engagement  Flowsheets (Taken 11/18/2024  1212)  Supportive Measures:   active listening utilized   verbalization of feelings encouraged  Diversional Activity: television     Problem: Psychotic Signs/Symptoms  Goal: Decreased Sensory Symptoms (Psychotic Signs/Symptoms)  Flowsheets (Taken 11/18/2024 1212)  Mutually Determined Action Steps (Decreased Sensory Symptoms): adheres to medication regimen

## 2024-11-19 PROCEDURE — 25000003 PHARM REV CODE 250: Performed by: PEDIATRICS

## 2024-11-19 PROCEDURE — 25000003 PHARM REV CODE 250: Performed by: PSYCHIATRY & NEUROLOGY

## 2024-11-19 PROCEDURE — 12400001 HC PSYCH SEMI-PRIVATE ROOM

## 2024-11-19 PROCEDURE — S4991 NICOTINE PATCH NONLEGEND: HCPCS | Performed by: PSYCHIATRY & NEUROLOGY

## 2024-11-19 RX ADMIN — ARIPIPRAZOLE 5 MG: 5 TABLET ORAL at 09:11

## 2024-11-19 RX ADMIN — HYDROXYZINE HYDROCHLORIDE 50 MG: 50 TABLET, FILM COATED ORAL at 08:11

## 2024-11-19 RX ADMIN — LORAZEPAM 0.5 MG: 1 TABLET ORAL at 03:11

## 2024-11-19 RX ADMIN — LORAZEPAM 0.5 MG: 1 TABLET ORAL at 09:11

## 2024-11-19 RX ADMIN — LORAZEPAM 0.5 MG: 1 TABLET ORAL at 08:11

## 2024-11-19 RX ADMIN — SERTRALINE HYDROCHLORIDE 100 MG: 50 TABLET ORAL at 09:11

## 2024-11-19 RX ADMIN — TRAZODONE HYDROCHLORIDE 100 MG: 100 TABLET ORAL at 08:11

## 2024-11-19 RX ADMIN — TAMSULOSIN HYDROCHLORIDE 0.4 MG: 0.4 CAPSULE ORAL at 09:11

## 2024-11-19 RX ADMIN — NICOTINE 1 PATCH: 21 PATCH, EXTENDED RELEASE TRANSDERMAL at 09:11

## 2024-11-19 NOTE — NURSING
2006 Pt requested PRN sleep and anxiety medication. Administered trazodone 100 mg po and atarax 50 mg po.     2200 Pt in bed, eyes closed, resting quietly with no signs of anxiety.

## 2024-11-19 NOTE — PROGRESS NOTES
Refused despite encouragement, Alternative materials were offered.      11/19/24 1000   [facility] Group Therapy   Group Name Therapeutic Recreation   Specific Interventions Skilled Activity Mild Exercises   Participation Level None   Participation Quality Refused

## 2024-11-19 NOTE — PLAN OF CARE
Problem: Psychotic Signs/Symptoms  Goal: Improved Behavioral Control (Psychotic Signs/Symptoms)  Outcome: Progressing  Flowsheets (Taken 11/19/2024 1149)  Mutually Determined Action Steps (Improved Behavioral Control): identifies symptoms triggers  Intervention: Manage Behavior  Flowsheets (Taken 11/19/2024 1149)  De-Escalation Techniques: physical activity promoted  Goal: Optimal Cognitive Function (Psychotic Signs/Symptoms)  Outcome: Progressing  Flowsheets (Taken 11/19/2024 1149)  Mutually Determined Action Steps (Optimal Cognitive Function): participates in attention training  Intervention: Support and Promote Cognitive Ability  Flowsheets (Taken 11/19/2024 1149)  Trust Relationship/Rapport:   care explained   thoughts/feelings acknowledged  Goal: Increased Participation and Engagement (Psychotic Signs/Symptoms)  Outcome: Progressing  Flowsheets (Taken 11/19/2024 1149)  Mutually Determined Action Steps (Increased Participation and Engagement): identifies symptoms triggers  Intervention: Facilitate Participation and Engagement  Flowsheets (Taken 11/19/2024 1149)  Supportive Measures:   verbalization of feelings encouraged   self-care encouraged  Diversional Activity: art work  Goal: Improved Mood Symptoms (Psychotic Signs/Symptoms)  Outcome: Progressing  Flowsheets (Taken 11/19/2024 1149)  Mutually Determined Action Steps (Improved Mood Symptoms):   acknowledges progress   verbalizes increased insight  Intervention: Optimize Emotion and Mood  Flowsheets (Taken 11/19/2024 1149)  Supportive Measures:   verbalization of feelings encouraged   self-care encouraged  Diversional Activity: art work  Goal: Improved Psychomotor Symptoms (Psychotic Signs/Symptoms)  Outcome: Progressing  Flowsheets (Taken 11/19/2024 1149)  Mutually Determined Action Steps (Improved Psychomotor Symptoms): adheres to medication regimen  Intervention: Manage Psychomotor Movement  Flowsheets (Taken 11/19/2024 1149)  Activity (Behavioral  Health): up ad tirso  Diversional Activity: art work     Problem: Psychotic Signs/Symptoms  Goal: Decreased Sensory Symptoms (Psychotic Signs/Symptoms)  Flowsheets (Taken 11/19/2024 1149)  Mutually Determined Action Steps (Decreased Sensory Symptoms): adheres to medication regimen  Intervention: Minimize and Manage Sensory Impairment  Flowsheets (Taken 11/19/2024 1148)  Sensory Stimulation Regulation: quiet environment promoted

## 2024-11-19 NOTE — PLAN OF CARE
Calm and cooperative with staff. Compliant with meds. Yawning at nurses' station for med pass. Returned to room after med pass. Refused group. Continues to isolate in room. Refused group on day shift.       Problem: Adult Behavioral Health Plan of Care  Goal: Plan of Care Review  Outcome: Progressing  Goal: Patient-Specific Goal (Individualization)  Outcome: Progressing  Goal: Adheres to Safety Considerations for Self and Others  Outcome: Progressing  Goal: Absence of New-Onset Illness or Injury  Outcome: Progressing  Goal: Optimized Coping Skills in Response to Life Stressors  Outcome: Progressing  Goal: Develops/Participates in Therapeutic [place] to Support Successful Transition  Outcome: Progressing  Goal: Rounds/Family Conference  Outcome: Progressing     Problem: Psychotic Signs/Symptoms  Goal: Improved Behavioral Control (Psychotic Signs/Symptoms)  Outcome: Progressing  Goal: Optimal Cognitive Function (Psychotic Signs/Symptoms)  Outcome: Progressing  Goal: Increased Participation and Engagement (Psychotic Signs/Symptoms)  Outcome: Progressing  Goal: Improved Mood Symptoms (Psychotic Signs/Symptoms)  Outcome: Progressing  Goal: Improved Psychomotor Symptoms (Psychotic Signs/Symptoms)  Outcome: Progressing  Goal: Decreased Sensory Symptoms (Psychotic Signs/Symptoms)  Outcome: Progressing  Goal: Improved Sleep (Psychotic Signs/Symptoms)  Outcome: Progressing  Goal: Enhanced Social, Occupational or Functional Skills (Psychotic Signs/Symptoms)  Outcome: Progressing     Problem: Excessive Substance Use  Goal: Optimized Energy Level (Excessive Substance Use)  Outcome: Progressing  Goal: Improved Behavioral Control (Excessive Substance Use)  Outcome: Progressing  Goal: Increased Participation and Engagement (Excessive Substance Use)  Outcome: Progressing  Goal: Improved Physiologic Symptoms (Excessive Substance Use)  Outcome: Progressing  Goal: Enhanced Social, Occupational or Functional Skills (Excessive Substance  Use)  Outcome: Progressing

## 2024-11-19 NOTE — PROGRESS NOTES
"11/19/2024  Cesar Macedo   1990   [id number]        Psychiatry Progress Note     Chief Complaint: "Depressed still"    SUBJECTIVE:   Cesar Macedo is a 34 y.o. male placed under a PEC at [facility] [place] to paranoia and auditory hallucinations following methamphetamine use approximately three hours before his arrival at the ED. He has a documented history of substance abuse and reported in the ED that he heard "people that want him to die" and "conversations about himself."     This morning, the patient reports feeling stressed and anxious due to difficulty contacting anyone outside to bring him clothes and to retrieve his paycheck. He has not been attending group sessions and remains largely isolated, with the exception of meal times. The patient is tolerating his medications well, with no reported issues or signs of withdrawal such as tremors, diaphoresis, or diarrhea. He reports an improvement in auditory hallucinations since being started on Abilify yesterday and appears to be tolerating this new addition to his regimen well. The current plan of care will be maintained, with continued monitoring of his progress.       UDS: (+)amphetamines, cannabis  Blood alcohol: <10    Current Medications:   Scheduled Meds:    ARIPiprazole  5 mg Oral Daily    LORazepam  0.5 mg Oral TID    nicotine  1 patch Transdermal Daily    sertraline  100 mg Oral Daily    tamsulosin  0.4 mg Oral Daily      PRN Meds:   Current Facility-Administered Medications:     acetaminophen, 650 mg, Oral, Q6H PRN    aluminum-magnesium hydroxide-simethicone, 30 mL, Oral, Q6H PRN    cloNIDine, 0.1 mg, Oral, Q8H PRN    cloNIDine, 0.2 mg, Oral, Q8H PRN    haloperidoL, 10 mg, Oral, Q4H PRN **AND** diphenhydrAMINE, 50 mg, Oral, Q4H PRN **AND** LORazepam, 2 mg, Oral, Q4H PRN **AND** haloperidol lactate, 10 mg, Intramuscular, Q4H PRN **AND** diphenhydrAMINE, 50 mg, Intramuscular, Q4H PRN **AND** lorazepam, 2 mg, Intramuscular, Q4H PRN    hydrOXYzine HCL, 50 mg, " "Oral, Q4H PRN    traZODone, 100 mg, Oral, Nightly PRN   Psychotherapeutics (From admission, onward)      Start     Stop Route Frequency Ordered    11/19/24 0900  LORazepam tablet 0.5 mg         11/21/24 0859 Oral 3 times daily 11/15/24 1001    11/19/24 0900  sertraline tablet 100 mg         -- Oral Daily 11/18/24 1031    11/18/24 1145  ARIPiprazole tablet 5 mg         -- Oral Daily 11/18/24 1031    11/16/24 0000  haloperidoL tablet 10 mg  (Med - Acute  Behavioral Management)        Placed in "And" Linked Group    -- Oral Every 4 hours PRN 11/15/24 0137    11/16/24 0000  LORazepam tablet 2 mg  (Med - Acute  Behavioral Management)        Placed in "And" Linked Group    -- Oral Every 4 hours PRN 11/15/24 0137    11/16/24 0000  haloperidol lactate injection 10 mg  (Med - Acute  Behavioral Management)        Placed in "And" Linked Group    -- IM Every 4 hours PRN 11/15/24 0137    11/16/24 0000  LORazepam injection 2 mg  (Med - Acute  Behavioral Management)        Placed in "And" Linked Group    -- IM Every 4 hours PRN 11/15/24 0137    11/15/24 1015  LORazepam tablet 2 mg         11/17/24 0859 Oral 3 times daily 11/15/24 1001    11/15/24 0137  traZODone tablet 100 mg         -- Oral Nightly PRN 11/15/24 0137            Allergies:   Review of patient's allergies indicates:   Allergen Reactions    Fish containing products Swelling        OBJECTIVE:   Vitals   Vitals:    11/18/24 1601   BP: 116/76   Pulse: 98   Resp: 18   Temp:         Labs/Imaging/Studies:   No results found for this or any previous visit (from the past 36 hours).       Medical Review Of Systems:  Constitutional: negative  Respiratory: negative  Cardiovascular: negative  Gastrointestinal: negative  Genitourinary:negative  Musculoskeletal:negative  Neurological: negative       Psychiatric Mental Status Exam:  General Appearance: appears stated age, well-developed, well-nourished  Arousal: alert  Behavior: cooperative  Movements and Motor Activity: no " "abnormal involuntary movements noted  Orientation: oriented to person, place, time, and situation  Speech: normal rate, normal rhythm, normal volume, normal tone  Mood: "Ok"  Affect: constricted  Thought Process: linear  Associations: intact  Thought Content and Perceptions: (+)auditory hallucinations, no suicidal ideation, no homicidal ideation  Recent and Remote Memory: recent memory intact, remote memory intact; per interview/observation with patient  Attention and Concentration: intact, attentive to conversation; per interview/observation with patient  Fund of Knowledge: intact, aware of current events, vocabulary appropriate; based on history, vocabulary, fund of knowledge, syntax, grammar, and content  Insight: questionable; based on understanding of severity of illness and HPI  Judgment: questionable; based on patient's behavior and HPI     ASSESSMENT/PLAN:   Problems Addressed/Diagnoses:  Major Depressive Disorder, recurrent, severe (F33.2)   Psychotic disorder F(29)  Anxiety Disorder (F41.9)  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     Plan:  Depression, chronic with acute exacerbation  -Continue Zoloft 100mg daily  -Abilify 5mg daily    Psychosis, chronic with acute exacerbation  -Abilify 5mg daily    Anxiety, chronic with acute exacerbation  -Increase Zoloft to 100mg daily    Amphetamine use, chronic with acute exacerbation  -Group/Individual psychotherapy    Cannabis use, chronic with acute exacerbation  -Group/Individual psychotherapy     Expected Disposition Plan: Substance treatment program        Julito Bradshaw, EDWINA-BC  "

## 2024-11-19 NOTE — PROGRESS NOTES
Refused despite encouragement, Alternative materials were offered.    11/19/24 1400   [facility] Group Therapy   Group Name Therapeutic Recreation   Specific Interventions Skilled Activity Creative Expression   Participation Level None   Participation Quality Refused

## 2024-11-20 PROCEDURE — 25000003 PHARM REV CODE 250: Performed by: PSYCHIATRY & NEUROLOGY

## 2024-11-20 PROCEDURE — 25000003 PHARM REV CODE 250: Performed by: PEDIATRICS

## 2024-11-20 PROCEDURE — S4991 NICOTINE PATCH NONLEGEND: HCPCS | Performed by: PSYCHIATRY & NEUROLOGY

## 2024-11-20 PROCEDURE — 12400001 HC PSYCH SEMI-PRIVATE ROOM

## 2024-11-20 RX ORDER — ARIPIPRAZOLE 5 MG/1
10 TABLET ORAL DAILY
Status: DISCONTINUED | OUTPATIENT
Start: 2024-11-21 | End: 2024-11-23 | Stop reason: HOSPADM

## 2024-11-20 RX ORDER — ARIPIPRAZOLE 5 MG/1
5 TABLET ORAL ONCE
Status: COMPLETED | OUTPATIENT
Start: 2024-11-20 | End: 2024-11-20

## 2024-11-20 RX ADMIN — LORAZEPAM 0.5 MG: 1 TABLET ORAL at 02:11

## 2024-11-20 RX ADMIN — TAMSULOSIN HYDROCHLORIDE 0.4 MG: 0.4 CAPSULE ORAL at 08:11

## 2024-11-20 RX ADMIN — ARIPIPRAZOLE 5 MG: 5 TABLET ORAL at 10:11

## 2024-11-20 RX ADMIN — TRAZODONE HYDROCHLORIDE 100 MG: 100 TABLET ORAL at 08:11

## 2024-11-20 RX ADMIN — ARIPIPRAZOLE 5 MG: 5 TABLET ORAL at 08:11

## 2024-11-20 RX ADMIN — SERTRALINE HYDROCHLORIDE 100 MG: 50 TABLET ORAL at 08:11

## 2024-11-20 RX ADMIN — HYDROXYZINE HYDROCHLORIDE 50 MG: 50 TABLET, FILM COATED ORAL at 08:11

## 2024-11-20 RX ADMIN — NICOTINE 1 PATCH: 21 PATCH, EXTENDED RELEASE TRANSDERMAL at 08:11

## 2024-11-20 RX ADMIN — LORAZEPAM 0.5 MG: 1 TABLET ORAL at 08:11

## 2024-11-20 NOTE — PLAN OF CARE
"Pt with flat, distant, indifferent mood, stated non-compliance with group activity, stated "I got a lot I'm dealing with right now". Appearance disheveled, appearing tired, calm.  Problem: Adult Behavioral Health Plan of Care  Goal: Plan of Care Review  Outcome: Progressing  Goal: Patient-Specific Goal (Individualization)  Outcome: Progressing  Goal: Adheres to Safety Considerations for Self and Others  Outcome: Progressing  Goal: Absence of New-Onset Illness or Injury  Outcome: Progressing  Goal: Optimized Coping Skills in Response to Life Stressors  Outcome: Progressing  Goal: Develops/Participates in Therapeutic [place] to Support Successful Transition  Outcome: Progressing  Goal: Rounds/Family Conference  Outcome: Progressing     Problem: Excessive Substance Use  Goal: Optimized Energy Level (Excessive Substance Use)  Outcome: Progressing  Goal: Improved Behavioral Control (Excessive Substance Use)  Outcome: Progressing  Goal: Increased Participation and Engagement (Excessive Substance Use)  Outcome: Progressing  Goal: Improved Physiologic Symptoms (Excessive Substance Use)  Outcome: Progressing  Goal: Enhanced Social, Occupational or Functional Skills (Excessive Substance Use)  Outcome: Progressing     "

## 2024-11-20 NOTE — PROGRESS NOTES
"11/20/2024  Cesar Macedo   1990   [id number]        Psychiatry Progress Note     Chief Complaint: "Slowly but surely getting better"    SUBJECTIVE:   Cesar Macedo is a 34 y.o. male placed under a PEC at [facility] [place] due to paranoia and auditory hallucinations following methamphetamine use approximately three hours before his arrival at the ED. He has a documented history of substance abuse and reported in the ED that he heard "people that want him to die" and "conversations about himself."     [facility]  Reports auditory hallucinations improved.  Mostly experiencing them at night.  Will increase Abilify today to 10mg daily.  Calm, cooperative, and pleasant during evaluation.  Medication compliant.  Will make these adjustments and will monitor progress.       UDS: (+)amphetamines, cannabis  Blood alcohol: <10    Current Medications:   Scheduled Meds:    ARIPiprazole  5 mg Oral Daily    LORazepam  0.5 mg Oral TID    nicotine  1 patch Transdermal Daily    sertraline  100 mg Oral Daily    tamsulosin  0.4 mg Oral Daily      PRN Meds:   Current Facility-Administered Medications:     acetaminophen, 650 mg, Oral, Q6H PRN    aluminum-magnesium hydroxide-simethicone, 30 mL, Oral, Q6H PRN    cloNIDine, 0.1 mg, Oral, Q8H PRN    cloNIDine, 0.2 mg, Oral, Q8H PRN    haloperidoL, 10 mg, Oral, Q4H PRN **AND** diphenhydrAMINE, 50 mg, Oral, Q4H PRN **AND** LORazepam, 2 mg, Oral, Q4H PRN **AND** haloperidol lactate, 10 mg, Intramuscular, Q4H PRN **AND** diphenhydrAMINE, 50 mg, Intramuscular, Q4H PRN **AND** lorazepam, 2 mg, Intramuscular, Q4H PRN    hydrOXYzine HCL, 50 mg, Oral, Q4H PRN    traZODone, 100 mg, Oral, Nightly PRN   Psychotherapeutics (From admission, onward)      Start     Stop Route Frequency Ordered    11/19/24 0900  LORazepam tablet 0.5 mg         11/21/24 0859 Oral 3 times daily 11/15/24 1001    11/19/24 0900  sertraline tablet 100 mg         -- Oral Daily 11/18/24 1031    11/18/24 1145  " "ARIPiprazole tablet 5 mg         -- Oral Daily 11/18/24 1031    11/16/24 0000  haloperidoL tablet 10 mg  (Med - Acute  Behavioral Management)        Placed in "And" Linked Group    -- Oral Every 4 hours PRN 11/15/24 0137    11/16/24 0000  LORazepam tablet 2 mg  (Med - Acute  Behavioral Management)        Placed in "And" Linked Group    -- Oral Every 4 hours PRN 11/15/24 0137    11/16/24 0000  haloperidol lactate injection 10 mg  (Med - Acute  Behavioral Management)        Placed in "And" Linked Group    -- IM Every 4 hours PRN 11/15/24 0137    11/16/24 0000  LORazepam injection 2 mg  (Med - Acute  Behavioral Management)        Placed in "And" Linked Group    -- IM Every 4 hours PRN 11/15/24 0137    11/15/24 1015  LORazepam tablet 2 mg         11/17/24 0859 Oral 3 times daily 11/15/24 1001    11/15/24 0137  traZODone tablet 100 mg         -- Oral Nightly PRN 11/15/24 0137            Allergies:   Review of patient's allergies indicates:   Allergen Reactions    Fish containing products Swelling        OBJECTIVE:   Vitals   Vitals:    11/19/24 1915   BP: (!) 143/82   Pulse: 85   Resp: 18   Temp: 98.2 °F (36.8 °C)        Labs/Imaging/Studies:   No results found for this or any previous visit (from the past 36 hours).       Medical Review Of Systems:  Constitutional: negative  Respiratory: negative  Cardiovascular: negative  Gastrointestinal: negative  Genitourinary:negative  Musculoskeletal:negative  Neurological: negative       Psychiatric Mental Status Exam:  General Appearance: appears stated age, well-developed, well-nourished  Arousal: alert  Behavior: cooperative  Movements and Motor Activity: no abnormal involuntary movements noted  Orientation: oriented to person, place, time, and situation  Speech: normal rate, normal rhythm, normal volume, normal tone  Mood: "Ok"  Affect: constricted  Thought Process: linear  Associations: intact  Thought Content and Perceptions: (+)auditory hallucinations, no suicidal " ideation, no homicidal ideation  Recent and Remote Memory: recent memory intact, remote memory intact; per interview/observation with patient  Attention and Concentration: intact, attentive to conversation; per interview/observation with patient  Fund of Knowledge: intact, aware of current events, vocabulary appropriate; based on history, vocabulary, fund of knowledge, syntax, grammar, and content  Insight: questionable; based on understanding of severity of illness and HPI  Judgment: questionable; based on patient's behavior and HPI     ASSESSMENT/PLAN:   Problems Addressed/Diagnoses:  Major Depressive Disorder, recurrent, severe (F33.2)   Psychotic disorder (F29)  Anxiety Disorder (F41.9)  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     Plan:  Depression, chronic with acute exacerbation  -Continue Zoloft 100mg daily  -Increase Abilify to 10mg daily    Psychosis, chronic with acute exacerbation  -Abilify 5mg daily    Anxiety, chronic with acute exacerbation  -Increase Zoloft to 100mg daily    Amphetamine use, chronic with acute exacerbation  -Group/Individual psychotherapy    Cannabis use, chronic with acute exacerbation  -Group/Individual psychotherapy     Expected Disposition Plan: Substance treatment program        Huey Pineda M.D.

## 2024-11-20 NOTE — NURSING
2001 Pt requested PRN sleep and anxiety medication. Administered trazodone 100 mg po and atarax 50 mg po.      2200 Pt in bed, eyes closed, snoring quietly. No signs of anxiety noted.

## 2024-11-20 NOTE — PROGRESS NOTES
11/20/24 [address] [place] [address] Group Therapy   Group Name Therapeutic Recreation   Specific Interventions Skilled Activity Leisure Education and Awareness   Participation Level Active;Supportive;Appropriate;Attentive;Sharing   Participation Quality Cooperative;Social   Insight/Motivation Improved;Applies New Skills   Affect/Mood Display Appropriate;Bright   Cognition Oriented;Alert   Psychomotor WNL

## 2024-11-20 NOTE — CARE UPDATE
Treatment Team  Pt seen for treatment team today with interdisciplinary team. Pt was calm and cooperative with Tx team. Pt did endorse AH but stated it is not as bad as it was. Pt reported it is normally at night and when he around a lot of people. Pt verbalized understanding of care plan and agreed to being discharged to rehab. Referral was sent to [facility] and staff will follow-up.

## 2024-11-20 NOTE — PLAN OF CARE
Cesar attended interdisciplinary treatment team, was cooperative, reporting improvement along with +AH, and slowly progressing towards reported treatment goal of anger management. CTRS reported to interdisciplinary team that Cesar refuses to attend TR groups despite encouragement, is isolative and focused on his paycheck, appropriate with peers, polite with staff, and attends his ADL's with improving insight.

## 2024-11-20 NOTE — PROGRESS NOTES
11/20/24 1500   [facility] Group Therapy   Group Name Therapeutic Recreation   Specific Interventions Skilled Activity Leisure Education and Awareness   Participation Level Active;Supportive;Appropriate;Attentive;Sharing   Participation Quality Cooperative;Social   Insight/Motivation Improved;Applies New Skills   Affect/Mood Display Appropriate;Bright   Cognition Oriented;Alert   Psychomotor WNL

## 2024-11-20 NOTE — PLAN OF CARE
Problem: Adult Behavioral Health Plan of Care  Goal: Plan of Care Review  Outcome: Progressing  Flowsheets (Taken 11/20/2024 0954)  Patient Agreement with Plan of Care: agrees  Plan of Care Reviewed With: patient  Goal: Patient-Specific Goal (Individualization)  Outcome: Progressing  Flowsheets (Taken 11/20/2024 0954)  Patient Personal Strengths: independent living skills  Patient Vulnerabilities: substance abuse/addiction  Goal: Adheres to Safety Considerations for Self and Others  Outcome: Progressing  Flowsheets (Taken 11/20/2024 0954)  Adheres to Safety Considerations for Self and Others: making progress toward outcome  Intervention: Develop and Maintain Individualized Safety Plan  Flowsheets (Taken 11/20/2024 0954)  Safety Measures: safety rounds completed  Goal: Absence of New-Onset Illness or Injury  Outcome: Progressing  Intervention: Identify and Manage Fall Risk  Flowsheets (Taken 11/20/2024 0954)  Safety Measures: safety rounds completed  Intervention: Prevent VTE (Venous Thromboembolism)  Flowsheets (Taken 11/20/2024 0954)  VTE Prevention/Management: ambulation promoted  Intervention: Prevent Infection  Flowsheets (Taken 11/20/2024 0954)  Infection Prevention: hand hygiene promoted  Goal: Optimized Coping Skills in Response to Life Stressors  Outcome: Progressing  Flowsheets (Taken 11/20/2024 0954)  Optimized Coping Skills in Response to Life Stressors: making progress toward outcome  Intervention: Promote Effective Coping Strategies  Flowsheets (Taken 11/20/2024 0954)  Supportive Measures: self-care encouraged  Goal: Develops/Participates in Therapeutic [place] to Support Successful Transition  Outcome: Progressing  Flowsheets (Taken 11/20/2024 0954)  Develops/Participates in Therapeutic [place] to Support Successful Transition: making progress toward outcome  Intervention: Foster Therapeutic [place]  Flowsheets (Taken 11/20/2024 0954)  Trust Relationship/Rapport: care explained  Goal:  Rounds/Family Conference  Outcome: Progressing  Flowsheets (Taken 11/20/2024 0954)  Participants:   milieu/psych techs   nursing     Problem: Psychotic Signs/Symptoms  Goal: Improved Behavioral Control (Psychotic Signs/Symptoms)  Outcome: Progressing  Flowsheets (Taken 11/20/2024 0954)  Mutually Determined Action Steps (Improved Behavioral Control): identifies symptoms triggers  Intervention: Manage Behavior  Flowsheets (Taken 11/20/2024 0954)  De-Escalation Techniques: quiet time facilitated  Goal: Optimal Cognitive Function (Psychotic Signs/Symptoms)  Outcome: Progressing  Flowsheets (Taken 11/20/2024 0954)  Mutually Determined Action Steps (Optimal Cognitive Function): participates in attention training  Intervention: Support and Promote Cognitive Ability  Flowsheets (Taken 11/20/2024 0954)  Trust Relationship/Rapport: care explained  Goal: Increased Participation and Engagement (Psychotic Signs/Symptoms)  Outcome: Progressing  Flowsheets (Taken 11/20/2024 0954)  Mutually Determined Action Steps (Increased Participation and Engagement): identifies symptoms triggers  Intervention: Facilitate Participation and Engagement  Flowsheets (Taken 11/20/2024 0954)  Supportive Measures: self-care encouraged  Diversional Activity: television  Goal: Improved Mood Symptoms (Psychotic Signs/Symptoms)  Outcome: Progressing  Flowsheets (Taken 11/20/2024 0954)  Mutually Determined Action Steps (Improved Mood Symptoms): acknowledges progress  Intervention: Optimize Emotion and Mood  Flowsheets (Taken 11/20/2024 0954)  Supportive Measures: self-care encouraged  Diversional Activity: television  Goal: Improved Psychomotor Symptoms (Psychotic Signs/Symptoms)  Outcome: Progressing  Flowsheets (Taken 11/20/2024 0954)  Mutually Determined Action Steps (Improved Psychomotor Symptoms): adheres to medication regimen  Intervention: Manage Psychomotor Movement  Flowsheets (Taken 11/20/2024 0954)  Activity (Behavioral Health): up ad  tirso  Patient Performed Hygiene: teeth brushed  Diversional Activity: television  Goal: Decreased Sensory Symptoms (Psychotic Signs/Symptoms)  Outcome: Progressing  Flowsheets (Taken 11/20/2024 0954)  Mutually Determined Action Steps (Decreased Sensory Symptoms): adheres to medication regimen  Intervention: Minimize and Manage Sensory Impairment  Flowsheets (Taken 11/20/2024 0954)  Sensory Stimulation Regulation: quiet environment promoted  Goal: Improved Sleep (Psychotic Signs/Symptoms)  Outcome: Progressing  Flowsheets (Taken 11/20/2024 0954)  Mutually Determined Action Steps (Improved Sleep): sleeps 4-6 hours at night  Intervention: Promote Healthy Sleep Hygiene  Flowsheets (Taken 11/20/2024 0954)  Sleep Hygiene Promotion: regular sleep pattern promoted  Goal: Enhanced Social, Occupational or Functional Skills (Psychotic Signs/Symptoms)  Outcome: Progressing  Flowsheets (Taken 11/20/2024 0954)  Mutually Determined Action Steps (Enhanced Social, Occupational or Functional Skills): participates in social skills training  Intervention: Promote Social, Occupational and Functional Ability  Flowsheets (Taken 11/20/2024 0954)  Trust Relationship/Rapport: care explained  Social Functional Ability Promotion: autonomy promoted     Problem: Excessive Substance Use  Goal: Optimized Energy Level (Excessive Substance Use)  Outcome: Progressing  Flowsheets (Taken 11/20/2024 0954)  Mutually Determined Action Steps (Optimized Energy Level): grooms self without prompting  Intervention: Optimize Energy Level  Flowsheets (Taken 11/20/2024 0954)  Activity (Behavioral Health): up ad tirso  Patient Performed Hygiene: teeth brushed  Diversional Activity: television  Goal: Improved Behavioral Control (Excessive Substance Use)  Outcome: Progressing  Flowsheets (Taken 11/20/2024 0954)  Mutually Determined Action Steps (Improved Behavioral Control): identifies major stressors  Intervention: Promote Behavior and Impulse Control  Flowsheets  (Taken 11/20/2024 0954)  Behavior Management: behavioral plan reviewed  Goal: Increased Participation and Engagement (Excessive Substance Use)  Outcome: Progressing  Flowsheets (Taken 11/20/2024 0954)  Mutually Determined Action Steps (Increased Participation and Engagement): discusses ongoing recovery plan  Intervention: Facilitate Participation and Engagement  Flowsheets (Taken 11/20/2024 0954)  Supportive Measures: self-care encouraged  Diversional Activity: television  Goal: Improved Physiologic Symptoms (Excessive Substance Use)  Outcome: Progressing  Flowsheets (Taken 11/20/2024 0954)  Mutually Determined Action Steps (Improved Physiologic Symptoms): discusses use pattern  Goal: Enhanced Social, Occupational or Functional Skills (Excessive Substance Use)  Outcome: Progressing  Flowsheets (Taken 11/20/2024 0954)  Mutually Determined Action Steps (Enhanced Social, Occupational or Functional Skills): participates in social skills training  Intervention: Promote Social, Occupational and Functional Ability  Flowsheets (Taken 11/20/2024 0954)  Trust Relationship/Rapport: care explained  Social Functional Ability Promotion: autonomy promoted    He is AAO X 4. Flat affect and blunted mood. Endorses anxiety this AM. Denies SI and HI. Endorses visual hallucinations AEB states he sees shadows. Good eye contact noted. Speech normal tone and speed. Interacts with selected patients and staff. Continue plan of care and provide a safe and therapeutic environment. Continue to monitor every fifteen minutes for safety.

## 2024-11-21 PROBLEM — F23 ACUTE PSYCHOSIS: Status: RESOLVED | Noted: 2024-11-14 | Resolved: 2024-11-21

## 2024-11-21 PROCEDURE — 12400001 HC PSYCH SEMI-PRIVATE ROOM

## 2024-11-21 PROCEDURE — 25000003 PHARM REV CODE 250: Performed by: PSYCHIATRY & NEUROLOGY

## 2024-11-21 PROCEDURE — 25000003 PHARM REV CODE 250: Performed by: PEDIATRICS

## 2024-11-21 PROCEDURE — S4991 NICOTINE PATCH NONLEGEND: HCPCS | Performed by: PSYCHIATRY & NEUROLOGY

## 2024-11-21 RX ORDER — IBUPROFEN 200 MG
1 TABLET ORAL DAILY
Qty: 28 PATCH | Refills: 0 | Status: SHIPPED | OUTPATIENT
Start: 2024-11-22 | End: 2024-12-20

## 2024-11-21 RX ORDER — SERTRALINE HYDROCHLORIDE 100 MG/1
100 TABLET, FILM COATED ORAL DAILY
Qty: 30 TABLET | Refills: 0 | Status: SHIPPED | OUTPATIENT
Start: 2024-11-22 | End: 2024-12-22

## 2024-11-21 RX ORDER — ARIPIPRAZOLE 10 MG/1
10 TABLET ORAL DAILY
Qty: 30 TABLET | Refills: 0 | Status: SHIPPED | OUTPATIENT
Start: 2024-11-22 | End: 2024-12-22

## 2024-11-21 RX ADMIN — TAMSULOSIN HYDROCHLORIDE 0.4 MG: 0.4 CAPSULE ORAL at 08:11

## 2024-11-21 RX ADMIN — SERTRALINE HYDROCHLORIDE 100 MG: 50 TABLET ORAL at 08:11

## 2024-11-21 RX ADMIN — NICOTINE 1 PATCH: 21 PATCH, EXTENDED RELEASE TRANSDERMAL at 08:11

## 2024-11-21 RX ADMIN — TRAZODONE HYDROCHLORIDE 100 MG: 100 TABLET ORAL at 08:11

## 2024-11-21 RX ADMIN — ARIPIPRAZOLE 10 MG: 5 TABLET ORAL at 08:11

## 2024-11-21 NOTE — PLAN OF CARE
Problem: Adult Behavioral Health Plan of Care  Goal: Plan of Care Review  Outcome: Progressing  Flowsheets (Taken 11/21/2024 1304)  Consent Given to Review Plan with: Discharge plan and medication.  Patient Agreement with Plan of Care: agrees  Plan of Care Reviewed With: patient  Goal: Patient-Specific Goal (Individualization)  Outcome: Progressing  Flowsheets (Taken 11/21/2024 1304)  Patient Personal Strengths: ability to maintain sobriety  Individualized Care Needs: none  Anxieties, Fears or Concerns: none  Goal: Adheres to Safety Considerations for Self and Others  Outcome: Progressing  Goal: Absence of New-Onset Illness or Injury  Outcome: Progressing  Goal: Optimized Coping Skills in Response to Life Stressors  Outcome: Progressing  Flowsheets (Taken 11/21/2024 1304)  Optimized Coping Skills in Response to Life Stressors: achieves outcome  Goal: Develops/Participates in Therapeutic [place] to Support Successful Transition  Outcome: Progressing  Flowsheets (Taken 11/21/2024 1304)  Develops/Participates in Therapeutic [place] to Support Successful Transition: achieves outcome  Goal: Rounds/Family Conference  Outcome: Progressing     Problem: Psychotic Signs/Symptoms  Goal: Improved Behavioral Control (Psychotic Signs/Symptoms)  Outcome: Progressing  Goal: Optimal Cognitive Function (Psychotic Signs/Symptoms)  Outcome: Progressing  Flowsheets (Taken 11/21/2024 1304)  Mutually Determined Action Steps (Optimal Cognitive Function): remains focused during activity  Goal: Increased Participation and Engagement (Psychotic Signs/Symptoms)  Outcome: Progressing  Flowsheets (Taken 11/21/2024 1304)  Mutually Determined Action Steps (Increased Participation and Engagement): identifies symptoms triggers  Goal: Improved Mood Symptoms (Psychotic Signs/Symptoms)  Outcome: Progressing  Flowsheets (Taken 11/21/2024 1304)  Mutually Determined Action Steps (Improved Mood Symptoms): engages in physical activity  Goal: Improved  Psychomotor Symptoms (Psychotic Signs/Symptoms)  Outcome: Progressing  Flowsheets (Taken 11/21/2024 1304)  Mutually Determined Action Steps (Improved Psychomotor Symptoms): adheres to medication regimen  Goal: Decreased Sensory Symptoms (Psychotic Signs/Symptoms)  Outcome: Progressing  Flowsheets (Taken 11/21/2024 1304)  Mutually Determined Action Steps (Decreased Sensory Symptoms): adheres to medication regimen  Goal: Improved Sleep (Psychotic Signs/Symptoms)  Outcome: Progressing  Flowsheets (Taken 11/21/2024 1304)  Mutually Determined Action Steps (Improved Sleep): sleeps 4-6 hours at night  Goal: Enhanced Social, Occupational or Functional Skills (Psychotic Signs/Symptoms)  Outcome: Progressing  Flowsheets (Taken 11/21/2024 1304)  Mutually Determined Action Steps (Enhanced Social, Occupational or Functional Skills): participates in social skills training     Problem: Excessive Substance Use  Goal: Optimized Energy Level (Excessive Substance Use)  Outcome: Progressing  Flowsheets (Taken 11/21/2024 1304)  Mutually Determined Action Steps (Optimized Energy Level): grooms self without prompting  Goal: Improved Behavioral Control (Excessive Substance Use)  Outcome: Progressing  Flowsheets (Taken 11/21/2024 1304)  Mutually Determined Action Steps (Improved Behavioral Control): identifies major stressors  Goal: Increased Participation and Engagement (Excessive Substance Use)  Outcome: Progressing  Flowsheets (Taken 11/21/2024 1304)  Mutually Determined Action Steps (Increased Participation and Engagement): discusses ongoing recovery plan  Goal: Improved Physiologic Symptoms (Excessive Substance Use)  Outcome: Progressing  Flowsheets (Taken 11/21/2024 1304)  Mutually Determined Action Steps (Improved Physiologic Symptoms): discusses use pattern  Goal: Enhanced Social, Occupational or Functional Skills (Excessive Substance Use)  Outcome: Progressing  Flowsheets (Taken 11/21/2024 1304)  Mutually Determined Action Steps  (Enhanced Social, Occupational or Functional Skills): participates in social skills training

## 2024-11-21 NOTE — NURSING
"Pt stated "I am a little anxious and also need something for sleep." PRN Atrax and Trazodone administered.  "

## 2024-11-21 NOTE — PROGRESS NOTES
11/21/24 1500   [facility] Group Therapy   Group Name Therapeutic Recreation   Specific Interventions Skilled Activity Leisure Education and Awareness   Participation Level Active;Supportive;Appropriate;Attentive;Sharing   Participation Quality Cooperative;Social   Insight/Motivation Improved;Applies New Skills   Affect/Mood Display Appropriate;Bright   Cognition Alert;Oriented   Psychomotor WNL

## 2024-11-21 NOTE — PROGRESS NOTES
"11/21/2024  Cesar Macedo   1990   [id number]        Psychiatry Progress Note     Chief Complaint: "Slowly but surely getting better"    SUBJECTIVE:   Cesar Macedo is a 34 y.o. male placed under a PEC at [facility] due to paranoia and auditory hallucinations following methamphetamine use approximately three hours before his arrival at the ED. He has a documented history of substance abuse and reported in the ED that he heard "people that want him to die" and "conversations about himself."     The patient remains awaiting rehab placement and denies any auditory hallucinations today. Abilify was increased to 10 mg daily yesterday, and he is tolerating the adjustment well. He was calm, cooperative, and pleasant during the evaluation and remains compliant with his medication regimen. The current plan of care will be continued, with ongoing monitoring of his progress. The patient is scheduled for discharge to rehab tomorrow, with follow-up on placement details to be coordinated with staff.      UDS: (+)amphetamines, cannabis  Blood alcohol: <10    Current Medications:   Scheduled Meds:    ARIPiprazole  10 mg Oral Daily    nicotine  1 patch Transdermal Daily    sertraline  100 mg Oral Daily    tamsulosin  0.4 mg Oral Daily      PRN Meds:   Current Facility-Administered Medications:     acetaminophen, 650 mg, Oral, Q6H PRN    aluminum-magnesium hydroxide-simethicone, 30 mL, Oral, Q6H PRN    cloNIDine, 0.1 mg, Oral, Q8H PRN    cloNIDine, 0.2 mg, Oral, Q8H PRN    haloperidoL, 10 mg, Oral, Q4H PRN **AND** diphenhydrAMINE, 50 mg, Oral, Q4H PRN **AND** LORazepam, 2 mg, Oral, Q4H PRN **AND** haloperidol lactate, 10 mg, Intramuscular, Q4H PRN **AND** diphenhydrAMINE, 50 mg, Intramuscular, Q4H PRN **AND** lorazepam, 2 mg, Intramuscular, Q4H PRN    hydrOXYzine HCL, 50 mg, Oral, Q4H PRN    traZODone, 100 mg, Oral, Nightly PRN   Psychotherapeutics (From admission, onward)      Start     Stop Route Frequency Ordered    11/21/24 0900  " "ARIPiprazole tablet 10 mg         -- Oral Daily 11/20/24 0956    11/19/24 0900  sertraline tablet 100 mg         -- Oral Daily 11/18/24 1031    11/16/24 0000  haloperidoL tablet 10 mg  (Med - Acute  Behavioral Management)        Placed in "And" Linked Group    -- Oral Every 4 hours PRN 11/15/24 0137    11/16/24 0000  LORazepam tablet 2 mg  (Med - Acute  Behavioral Management)        Placed in "And" Linked Group    -- Oral Every 4 hours PRN 11/15/24 0137    11/16/24 0000  haloperidol lactate injection 10 mg  (Med - Acute  Behavioral Management)        Placed in "And" Linked Group    -- IM Every 4 hours PRN 11/15/24 0137    11/16/24 0000  LORazepam injection 2 mg  (Med - Acute  Behavioral Management)        Placed in "And" Linked Group    -- IM Every 4 hours PRN 11/15/24 0137    11/15/24 1015  LORazepam tablet 2 mg         11/17/24 0859 Oral 3 times daily 11/15/24 1001    11/15/24 0137  traZODone tablet 100 mg         -- Oral Nightly PRN 11/15/24 0137            Allergies:   Review of patient's allergies indicates:   Allergen Reactions    Fish containing products Swelling        OBJECTIVE:   Vitals   Vitals:    11/21/24 0800   BP:    Pulse: 63   Resp:    Temp:         Labs/Imaging/Studies:   No results found for this or any previous visit (from the past 36 hours).       Medical Review Of Systems:  Constitutional: negative  Respiratory: negative  Cardiovascular: negative  Gastrointestinal: negative  Genitourinary:negative  Musculoskeletal:negative  Neurological: negative       Psychiatric Mental Status Exam:  General Appearance: appears stated age, well-developed, well-nourished  Arousal: alert  Behavior: cooperative  Movements and Motor Activity: no abnormal involuntary movements noted  Orientation: oriented to person, place, time, and situation  Speech: normal rate, normal rhythm, normal volume, normal tone  Mood: "Ok"  Affect: constricted  Thought Process: linear  Associations: intact  Thought Content and " Perceptions: (+)auditory hallucinations, no suicidal ideation, no homicidal ideation  Recent and Remote Memory: recent memory intact, remote memory intact; per interview/observation with patient  Attention and Concentration: intact, attentive to conversation; per interview/observation with patient  Fund of Knowledge: intact, aware of current events, vocabulary appropriate; based on history, vocabulary, fund of knowledge, syntax, grammar, and content  Insight: questionable; based on understanding of severity of illness and HPI  Judgment: questionable; based on patient's behavior and HPI     ASSESSMENT/PLAN:   Problems Addressed/Diagnoses:  Major Depressive Disorder, recurrent, severe (F33.2)   Psychotic disorder (F29)  Anxiety Disorder (F41.9)  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     Plan:  Depression, chronic with acute exacerbation  -Continue Zoloft 100mg daily  -Continue Abilify 10mg daily    Psychosis, chronic with acute exacerbation  -Abilify    Anxiety, chronic with acute exacerbation  -Zoloft     Amphetamine use, chronic with acute exacerbation  -Group/Individual psychotherapy    Cannabis use, chronic with acute exacerbation  -Group/Individual psychotherapy     Expected Disposition Plan: Substance treatment program        Julito Bradshaw, EDWINA-BC

## 2024-11-21 NOTE — PROGRESS NOTES
11/21/24 1000   [facility] Group Therapy   Group Name Therapeutic Recreation   Specific Interventions Skilled Activity Leisure Education and Awareness   Participation Level Supportive;Appropriate   Participation Quality Cooperative   Insight/Motivation Improved   Affect/Mood Display Appropriate;Flat   Cognition Oriented;Alert   Psychomotor WNL

## 2024-11-21 NOTE — PLAN OF CARE
Problem: Adult Behavioral Health Plan of Care  Goal: Plan of Care Review  Outcome: Progressing  Flowsheets (Taken 11/20/2024 2352)  Patient Agreement with Plan of Care: agrees  Plan of Care Reviewed With: patient  Goal: Patient-Specific Goal (Individualization)  Outcome: Progressing  Flowsheets (Taken 11/20/2024 2352)  Patient Personal Strengths: independent living skills  Patient Vulnerabilities: substance abuse/addiction  Goal: Adheres to Safety Considerations for Self and Others  Outcome: Progressing  Flowsheets (Taken 11/20/2024 2352)  Adheres to Safety Considerations for Self and Others: making progress toward outcome  Intervention: Develop and Maintain Individualized Safety Plan  Flowsheets (Taken 11/20/2024 2352)  Safety Measures: safety plan reviewed  Goal: Absence of New-Onset Illness or Injury  Outcome: Progressing  Goal: Optimized Coping Skills in Response to Life Stressors  Outcome: Progressing  Flowsheets (Taken 11/20/2024 2352)  Optimized Coping Skills in Response to Life Stressors: making progress toward outcome  Intervention: Promote Effective Coping Strategies  Flowsheets (Taken 11/20/2024 2352)  Supportive Measures:   active listening utilized   verbalization of feelings encouraged   self-care encouraged  Goal: Develops/Participates in Therapeutic [place] to Support Successful Transition  Outcome: Progressing  Flowsheets (Taken 11/20/2024 2352)  Develops/Participates in Therapeutic [place] to Support Successful Transition: making progress toward outcome  Intervention: Foster Therapeutic [place]  Flowsheets (Taken 11/20/2024 2352)  Trust Relationship/Rapport:   care explained   questions encouraged   reassurance provided  Goal: Rounds/Family Conference  Outcome: Progressing  Flowsheets (Taken 11/20/2024 2352)  Participants:   nursing   milieu/psych techs     Problem: Psychotic Signs/Symptoms  Goal: Improved Behavioral Control (Psychotic Signs/Symptoms)  Outcome: Progressing  Flowsheets (Taken  11/20/2024 2352)  Mutually Determined Action Steps (Improved Behavioral Control): verbalizes gratifying activity  Intervention: Manage Behavior  Flowsheets (Taken 11/20/2024 2352)  De-Escalation Techniques: quiet time facilitated  Goal: Optimal Cognitive Function (Psychotic Signs/Symptoms)  Outcome: Progressing  Flowsheets (Taken 11/20/2024 2352)  Mutually Determined Action Steps (Optimal Cognitive Function): participates in attention training  Intervention: Support and Promote Cognitive Ability  Flowsheets (Taken 11/20/2024 2352)  Trust Relationship/Rapport:   care explained   questions encouraged   reassurance provided  Goal: Increased Participation and Engagement (Psychotic Signs/Symptoms)  Outcome: Progressing  Flowsheets (Taken 11/20/2024 2352)  Mutually Determined Action Steps (Increased Participation and Engagement): verbalizes gratifying activity  Intervention: Facilitate Participation and Engagement  Flowsheets (Taken 11/20/2024 2352)  Supportive Measures:   active listening utilized   verbalization of feelings encouraged   self-care encouraged  Diversional Activity: television  Goal: Improved Mood Symptoms (Psychotic Signs/Symptoms)  Outcome: Progressing  Flowsheets (Taken 11/20/2024 2352)  Mutually Determined Action Steps (Improved Mood Symptoms): acknowledges progress  Intervention: Optimize Emotion and Mood  Flowsheets (Taken 11/20/2024 2352)  Supportive Measures:   active listening utilized   verbalization of feelings encouraged   self-care encouraged  Diversional Activity: television  Goal: Improved Psychomotor Symptoms (Psychotic Signs/Symptoms)  Outcome: Progressing  Flowsheets (Taken 11/20/2024 2352)  Mutually Determined Action Steps (Improved Psychomotor Symptoms): adheres to medication regimen  Intervention: Manage Psychomotor Movement  Flowsheets (Taken 11/20/2024 2352)  Activity (Behavioral Health): up ad tirso  Diversional Activity: television  Goal: Decreased Sensory Symptoms (Psychotic  Signs/Symptoms)  Outcome: Progressing  Flowsheets (Taken 11/20/2024 2352)  Mutually Determined Action Steps (Decreased Sensory Symptoms): adheres to medication regimen  Intervention: Minimize and Manage Sensory Impairment  Flowsheets (Taken 11/20/2024 2352)  Sensory Stimulation Regulation: quiet environment promoted  Goal: Improved Sleep (Psychotic Signs/Symptoms)  Outcome: Progressing  Flowsheets (Taken 11/20/2024 2352)  Mutually Determined Action Steps (Improved Sleep): sleeps 4-6 hours at night  Intervention: Promote Healthy Sleep Hygiene  Flowsheets (Taken 11/20/2024 2352)  Sleep Hygiene Promotion:   awakenings minimized   regular sleep pattern promoted  Goal: Enhanced Social, Occupational or Functional Skills (Psychotic Signs/Symptoms)  Outcome: Progressing  Flowsheets (Taken 11/20/2024 2352)  Mutually Determined Action Steps (Enhanced Social, Occupational or Functional Skills): participates in social skills training  Intervention: Promote Social, Occupational and Functional Ability  Flowsheets (Taken 11/20/2024 2352)  Trust Relationship/Rapport:   care explained   questions encouraged   reassurance provided  Social Functional Ability Promotion:   autonomy promoted   self-expression encouraged   social interaction promoted     Problem: Excessive Substance Use  Goal: Optimized Energy Level (Excessive Substance Use)  Outcome: Progressing  Flowsheets (Taken 11/20/2024 2352)  Mutually Determined Action Steps (Optimized Energy Level): grooms self without prompting  Intervention: Optimize Energy Level  Flowsheets (Taken 11/20/2024 2352)  Activity (Behavioral Health): up ad tirso  Diversional Activity: television  Goal: Improved Behavioral Control (Excessive Substance Use)  Outcome: Progressing  Flowsheets (Taken 11/20/2024 2352)  Mutually Determined Action Steps (Improved Behavioral Control): identifies major stressors  Intervention: Promote Behavior and Impulse Control  Flowsheets (Taken 11/20/2024 2352)  Behavior  Management: behavioral plan reviewed  Goal: Increased Participation and Engagement (Excessive Substance Use)  Outcome: Progressing  Flowsheets (Taken 11/20/2024 2352)  Mutually Determined Action Steps (Increased Participation and Engagement): discusses ongoing recovery plan  Intervention: Facilitate Participation and Engagement  Flowsheets (Taken 11/20/2024 2352)  Supportive Measures:   active listening utilized   verbalization of feelings encouraged   self-care encouraged  Diversional Activity: television  Goal: Improved Physiologic Symptoms (Excessive Substance Use)  Outcome: Progressing  Flowsheets (Taken 11/20/2024 2352)  Mutually Determined Action Steps (Improved Physiologic Symptoms): discusses use pattern  Intervention: Optimize Physiologic Function  Flowsheets (Taken 11/20/2024 2352)  Oral Nutrition Promotion:   social interaction promoted   physical activity promoted  Goal: Enhanced Social, Occupational or Functional Skills (Excessive Substance Use)  Outcome: Progressing  Flowsheets (Taken 11/20/2024 2352)  Mutually Determined Action Steps (Enhanced Social, Occupational or Functional Skills): participates in social skills training  Intervention: Promote Social, Occupational and Functional Ability  Flowsheets (Taken 11/20/2024 2352)  Trust Relationship/Rapport:   care explained   questions encouraged   reassurance provided  Social Functional Ability Promotion:   autonomy promoted   self-expression encouraged   social interaction promoted     AAOx4 Pt denies SI/HI. Pt endorses having anxiety and depression, reports poor sleep and good appetite. Pt has poor eye contact and affect is flat. Q15 min safety checks continued.

## 2024-11-21 NOTE — NURSING
Patient alert and oriented x 4, ambulatory with steady gait.Claimed off random voices on and off but not as loud as before per statement [place]adows I still see but fainting per assessment  Denies  SI and HI . Calm and cooperative.Patient has been attending group per CTRS .

## 2024-11-22 PROBLEM — F12.20 CANNABIS DEPENDENCE, CONTINUOUS: Status: ACTIVE | Noted: 2024-11-22

## 2024-11-22 PROBLEM — F41.9 ANXIETY DISORDER: Status: ACTIVE | Noted: 2024-11-22

## 2024-11-22 PROBLEM — F29 PSYCHOTIC DISORDER: Status: ACTIVE | Noted: 2024-11-14

## 2024-11-22 PROBLEM — F33.2 MAJOR DEPRESSIVE DISORDER, RECURRENT, SEVERE WITHOUT PSYCHOTIC FEATURES: Status: ACTIVE | Noted: 2024-11-22

## 2024-11-22 PROBLEM — F15.20 METHAMPHETAMINE ADDICTION: Status: ACTIVE | Noted: 2024-11-22

## 2024-11-22 PROCEDURE — S4991 NICOTINE PATCH NONLEGEND: HCPCS | Performed by: PSYCHIATRY & NEUROLOGY

## 2024-11-22 PROCEDURE — 12400001 HC PSYCH SEMI-PRIVATE ROOM

## 2024-11-22 PROCEDURE — 25000003 PHARM REV CODE 250: Performed by: PEDIATRICS

## 2024-11-22 PROCEDURE — 25000003 PHARM REV CODE 250: Performed by: PSYCHIATRY & NEUROLOGY

## 2024-11-22 RX ADMIN — ARIPIPRAZOLE 10 MG: 5 TABLET ORAL at 08:11

## 2024-11-22 RX ADMIN — HYDROXYZINE HYDROCHLORIDE 50 MG: 50 TABLET, FILM COATED ORAL at 08:11

## 2024-11-22 RX ADMIN — SERTRALINE HYDROCHLORIDE 100 MG: 50 TABLET ORAL at 08:11

## 2024-11-22 RX ADMIN — NICOTINE 1 PATCH: 21 PATCH, EXTENDED RELEASE TRANSDERMAL at 03:11

## 2024-11-22 RX ADMIN — TRAZODONE HYDROCHLORIDE 100 MG: 100 TABLET ORAL at 08:11

## 2024-11-22 RX ADMIN — TAMSULOSIN HYDROCHLORIDE 0.4 MG: 0.4 CAPSULE ORAL at 09:11

## 2024-11-22 NOTE — PLAN OF CARE
Cesar met reported treatment goals of anger management.  CTRS Discharge Recommendations:  Encouraged Pt. to actively utilize available community resources to increase leisure involvement to decrease signs and symptoms of illness.  Encouraged Pt. to utilize coping skills on a regular basis to reduce the risk of decomposition and re-hospitalization.

## 2024-11-22 NOTE — NURSING
Patient is not going to rehabilitation center [facility] today due to transportation reasons .Early this shift patient's Nicotine patch was not given because he is going home. Dose reschedule for this patient and Nicotine patch 21 mg/24 hours patch was given to patient has requested.

## 2024-11-22 NOTE — DISCHARGE SUMMARY
"DISCHARGE SUMMARY  PSYCHIATRY      Admit Date: 11/15/2024  1:37 AM    Discharge Date:  11/22/2024    SITE:   [facility]    Discharge Attending Physician: Huey Pineda M.D.    Chief Complaint:  Hallucinations and feeling down    History of Present Illness On Admit:   Cesar Macedo is a 34 y.o. male placed under a [facility] due to paranoia and auditory hallucinations following methamphetamine use approximately three hours before his arrival at the ED. He has a documented history of substance abuse and reported in the ED that he heard "people that want him to die" and "conversations about himself."      During today's evaluation, the patient presented to the exam room appearing calm and cooperative. Staff have observed hyperactive behavior immediately upon arrival but note no overt behavioral issues. The patient denies any significant history of mental health conditions and reports that he has never experienced hallucinations of this nature before, suggesting that the psychotic episode was likely substance-induced. He does acknowledge a history of depression and anxiety for which he has received treatment in the past, though he is unable to recall the specific medications he has used. When prompted about a history with Zoloft, he recognized the name and expressed no negative experiences with it, showing a willingness to consider restarting it. The patient denies any suicidal or homicidal ideation at this time.     He has expressed a desire to enter a rehabilitation program following discharge.     The plan is to admit the patient for medication management and continued safety monitoring.        Admit Mental Status Exam:  General Appearance: appears stated age, well developed and nourished, adequately groomed and appropriately dressed, in no acute distress  Arousal: alert with clear sensorium  Behavior: normal; cooperative; reasonably friendly, pleasant, and " polite; appropriate eye-contact; under good behavioral control  Movements and Motor Activity: no abnormal involuntary movements noted; no tics, no tremors, no akathisia, no dystonia, no evidence of tardive dyskinesia; no psychomotor agitation or retardation  Orientation: intact; oriented fully to person, place, time and situation  Speech: intact; normal rate, rhythm, volume, tone and pitch; conversational, spontaneous, and coherent  Mood: Depressed  Affect: blunted  Thought Process: intact, linear, goal-directed, organized, logical  Associations: intact, no loosening of associations  Thought Content and Perceptions: no suicidal or homicidal ideation, no auditory or visual hallucinations, no paranoid ideation, no ideas of reference, no evidence of delusions or psychosis  Recent and Remote Memory: grossly intact, able to recall relevant and salient information from the recent and remote past; per interview/observation with patient  Attention and Concentration: grossly intact, attentive to the conversation and not readily distractible; per interview/observation with patient  Fund of Knowledge: grossly intact, used appropriate vocabulary and demonstrated an awareness of current events; based on history, vocabulary, fund of knowledge, syntax, grammar, and content  Insight: questionable; based on understanding of severity of illness and HPI  Judgment: questionable; based on patient's behavior and HPI      Diagnoses:  PRINCIPAL PROBLEM:  Major depressive disorder, recurrent, severe without psychotic features      PROBLEM LIST    Major depressive disorder, recurrent, severe without psychotic features    Psychotic disorder    Anxiety disorder    Methamphetamine addiction    Cannabis dependence, continuous        Hospital Course:   Patient was admitted to [facility] and started on Zoloft and an alcohol detox protocol.    11/18/24  This morning, reports that he has been depressed.  No tremors, diaphoresis, diarrhea, or other  withdrawal-related symptoms noted.  Still reporting auditory hallucinations.  He has never been on Abilify previously but amenable to a trial.  Will start this today and will monitor progress.       11/19/24  This morning, the patient reports feeling stressed and anxious due to difficulty contacting anyone outside to bring him clothes and to retrieve his paycheck. He has not been attending group sessions and remains largely isolated, with the exception of meal times. The patient is tolerating his medications well, with no reported issues or signs of withdrawal such as tremors, diaphoresis, or diarrhea. He reports an improvement in auditory hallucinations since being started on Abilify yesterday and appears to be tolerating this new addition to his regimen well. The current plan of care will be maintained, with continued monitoring of his progress.       11/20/24  Rehab referral sent to [facility].  Reports auditory hallucinations improved.  Mostly experiencing them at night.  Will increase Abilify today to 10mg daily.  Calm, cooperative, and pleasant during evaluation.  Medication compliant.  Will make these adjustments and will monitor progress.       11/21/24  The patient remains awaiting rehab placement and denies any auditory hallucinations today. Abilify was increased to 10 mg daily yesterday, and he is tolerating the adjustment well. He was calm, cooperative, and pleasant during the evaluation and remains compliant with his medication regimen. The current plan of care will be continued, with ongoing monitoring of his progress. The patient is scheduled for discharge to rehab tomorrow, with follow-up on placement details to be coordinated with staff.       11/22/24  The patient states that he is feeling good and denies any auditory hallucinations today. He was calm, cooperative, and pleasant during the evaluation and remains compliant with his medication regimen. The current plan of care will be  "continued, with ongoing monitoring of his progress. Will continue with the current plan. The patient is scheduled for discharge to rehab today.       Current Medications:   Scheduled Meds:    ARIPiprazole  10 mg Oral Daily    nicotine  1 patch Transdermal Daily    sertraline  100 mg Oral Daily    tamsulosin  0.4 mg Oral Daily          DISCHARGE EXAMINATION    VITALS   Vitals:    11/21/24 0700 11/21/24 0701 11/21/24 0800 11/22/24 0701   BP: 114/71 114/71  119/73   BP Location:    Right arm   Patient Position:    Sitting   Pulse: 63 63 63 65   Resp: 16 16  18   Temp: 98.2 °F (36.8 °C) 98.2 °F (36.8 °C)  97.6 °F (36.4 °C)   TempSrc: Oral   Oral   SpO2: 95%   97%   Weight:       Height:             Discharge Mental Status Exam:  General Appearance: appears stated age, well-developed, well-nourished  Arousal: alert  Behavior: cooperative  Movements and Motor Activity: no abnormal involuntary movements noted  Orientation: oriented to person, place, time, and situation  Speech: normal rate, normal rhythm, normal volume, normal tone  Mood: "Ok"  Affect: constricted  Thought Process: linear  Associations: intact  Thought Content and Perceptions: denies auditory hallucinations, no suicidal ideation, no homicidal ideation  Recent and Remote Memory: recent memory intact, remote memory intact; per interview/observation with patient  Attention and Concentration: intact, attentive to conversation; per interview/observation with patient  Fund of Knowledge: intact, aware of current events, vocabulary appropriate; based on history, vocabulary, fund of knowledge, syntax, grammar, and content  Insight: questionable; based on understanding of severity of illness and HPI  Judgment: questionable; based on patient's behavior and HPI       Discharge Condition:  Stable    Prognosis:  Fair    Justification for multiple antipsychotics:  N/a    Disposition:  discharged to rehab    Follow-up:   Follow-up Information       [facility]    Specialties: Behavioral Health, Psychiatry, Psychology  Contact information:  [address]  [facility] [address]  [phone]                             Medication Regimen:    Current Facility-Administered Medications:     acetaminophen tablet 650 mg, 650 mg, Oral, Q6H PRN, Huey Pineda MD, 650 mg at 11/17/24 2022    aluminum-magnesium hydroxide-simethicone 200-200-20 mg/5 mL suspension 30 mL, 30 mL, Oral, Q6H PRN, Huey Pineda MD    ARIPiprazole tablet 10 mg, 10 mg, Oral, Daily, Huey Pineda MD, 10 mg at 11/22/24 0848    cloNIDine tablet 0.1 mg, 0.1 mg, Oral, Q8H PRN, Ramses Linares MD    cloNIDine tablet 0.2 mg, 0.2 mg, Oral, Q8H PRN, Ramses Linares MD    haloperidoL tablet 10 mg, 10 mg, Oral, Q4H PRN **AND** diphenhydrAMINE capsule 50 mg, 50 mg, Oral, Q4H PRN **AND** LORazepam tablet 2 mg, 2 mg, Oral, Q4H PRN **AND** haloperidol lactate injection 10 mg, 10 mg, Intramuscular, Q4H PRN **AND** diphenhydrAMINE injection 50 mg, 50 mg, Intramuscular, Q4H PRN **AND** LORazepam injection 2 mg, 2 mg, Intramuscular, Q4H PRN, Huey Pineda MD    hydrOXYzine tablet 50 mg, 50 mg, Oral, Q4H PRN, Huey Pineda MD, 50 mg at 11/20/24 2017    nicotine 21 mg/24 hr 1 patch, 1 patch, Transdermal, Daily, Huey Pineda MD, 1 patch at 11/21/24 0833    sertraline tablet 100 mg, 100 mg, Oral, Daily, Huey Pineda MD, 100 mg at 11/22/24 0849    tamsulosin 24 hr capsule 0.4 mg, 0.4 mg, Oral, Daily, Ramses Linares MD, 0.4 mg at 11/22/24 0900    traZODone tablet 100 mg, 100 mg, Oral, Nightly PRN, Huey Pineda MD, 100 mg at 11/21/24 2009      Patient Instructions:   Continue medication regimen as prescribed.    Disposition plan per  - see  notes for details.    Patient instructed to call 911 or present to emergency department if any of the following complications develop status post discharge: suicidality,  homicidality, or grave disability.     Total time spent discharging patient: <30 minutes      Huey Pineda M.D.

## 2024-11-22 NOTE — PROGRESS NOTES
11/22/24 0920   [facility] Group Therapy   Group Name Therapeutic Recreation   Specific Interventions Skilled Activity Leisure Education and Awareness   Participation Level Active;Supportive;Appropriate;Attentive;Sharing   Participation Quality Social;Cooperative   Insight/Motivation Improved;Applies New Skills   Affect/Mood Display Appropriate;Bright   Cognition Alert;Oriented   Psychomotor WNL

## 2024-11-22 NOTE — PROGRESS NOTES
"11/22/2024  Cesar Macedo   1990   [id number]        Psychiatry Progress Note     Chief Complaint: "Slowly but surely getting better"    SUBJECTIVE:   Cesar Macedo is a 34 y.o. male placed under a PEC at [facility] due to paranoia and auditory hallucinations following methamphetamine use approximately three hours before his arrival at the ED. He has a documented history of substance abuse and reported in the ED that he heard "people that want him to die" and "conversations about himself."     The patient states that he is feeling good and denies any auditory hallucinations today. He was calm, cooperative, and pleasant during the evaluation and remains compliant with his medication regimen. The current plan of care will be continued, with ongoing monitoring of his progress. Will continue with the current plan. The patient is scheduled for discharge to rehab today.      UDS: (+)amphetamines, cannabis  Blood alcohol: <10    Current Medications:   Scheduled Meds:    ARIPiprazole  10 mg Oral Daily    nicotine  1 patch Transdermal Daily    sertraline  100 mg Oral Daily    tamsulosin  0.4 mg Oral Daily      PRN Meds:   Current Facility-Administered Medications:     acetaminophen, 650 mg, Oral, Q6H PRN    aluminum-magnesium hydroxide-simethicone, 30 mL, Oral, Q6H PRN    cloNIDine, 0.1 mg, Oral, Q8H PRN    cloNIDine, 0.2 mg, Oral, Q8H PRN    haloperidoL, 10 mg, Oral, Q4H PRN **AND** diphenhydrAMINE, 50 mg, Oral, Q4H PRN **AND** LORazepam, 2 mg, Oral, Q4H PRN **AND** haloperidol lactate, 10 mg, Intramuscular, Q4H PRN **AND** diphenhydrAMINE, 50 mg, Intramuscular, Q4H PRN **AND** lorazepam, 2 mg, Intramuscular, Q4H PRN    hydrOXYzine HCL, 50 mg, Oral, Q4H PRN    traZODone, 100 mg, Oral, Nightly PRN   Psychotherapeutics (From admission, onward)      Start     Stop Route Frequency Ordered    11/21/24 0900  ARIPiprazole tablet 10 mg         -- Oral Daily 11/20/24 0956    11/19/24 0900  sertraline tablet 100 mg         -- Oral Daily " "11/18/24 1031    11/16/24 0000  haloperidoL tablet 10 mg  (Med - Acute  Behavioral Management)        Placed in "And" Linked Group    -- Oral Every 4 hours PRN 11/15/24 0137    11/16/24 0000  LORazepam tablet 2 mg  (Med - Acute  Behavioral Management)        Placed in "And" Linked Group    -- Oral Every 4 hours PRN 11/15/24 0137    11/16/24 0000  haloperidol lactate injection 10 mg  (Med - Acute  Behavioral Management)        Placed in "And" Linked Group    -- IM Every 4 hours PRN 11/15/24 0137    11/16/24 0000  LORazepam injection 2 mg  (Med - Acute  Behavioral Management)        Placed in "And" Linked Group    -- IM Every 4 hours PRN 11/15/24 0137    11/15/24 1015  LORazepam tablet 2 mg         11/17/24 0859 Oral 3 times daily 11/15/24 1001    11/15/24 0137  traZODone tablet 100 mg         -- Oral Nightly PRN 11/15/24 0137            Allergies:   Review of patient's allergies indicates:   Allergen Reactions    Fish containing products Swelling        OBJECTIVE:   Vitals   Vitals:    11/21/24 0800   BP:    Pulse: 63   Resp:    Temp:         Labs/Imaging/Studies:   No results found for this or any previous visit (from the past 36 hours).       Medical Review Of Systems:  Constitutional: negative  Respiratory: negative  Cardiovascular: negative  Gastrointestinal: negative  Genitourinary:negative  Musculoskeletal:negative  Neurological: negative       Psychiatric Mental Status Exam:  General Appearance: appears stated age, well-developed, well-nourished  Arousal: alert  Behavior: cooperative  Movements and Motor Activity: no abnormal involuntary movements noted  Orientation: oriented to person, place, time, and situation  Speech: normal rate, normal rhythm, normal volume, normal tone  Mood: "Ok"  Affect: constricted  Thought Process: linear  Associations: intact  Thought Content and Perceptions: denies auditory hallucinations, no suicidal ideation, no homicidal ideation  Recent and Remote Memory: recent memory intact, " remote memory intact; per interview/observation with patient  Attention and Concentration: intact, attentive to conversation; per interview/observation with patient  Fund of Knowledge: intact, aware of current events, vocabulary appropriate; based on history, vocabulary, fund of knowledge, syntax, grammar, and content  Insight: questionable; based on understanding of severity of illness and HPI  Judgment: questionable; based on patient's behavior and HPI     ASSESSMENT/PLAN:   Problems Addressed/Diagnoses:  Major Depressive Disorder, recurrent, severe (F33.2)   Psychotic disorder (F29)  Anxiety Disorder (F41.9)  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     Plan:  Depression, chronic with acute exacerbation  -Continue Zoloft 100mg daily  -Continue Abilify 10mg daily    Psychosis, chronic with acute exacerbation  -Abilify    Anxiety, chronic with acute exacerbation  -Zoloft     Amphetamine use, chronic with acute exacerbation  -Group/Individual psychotherapy    Cannabis use, chronic with acute exacerbation  -Group/Individual psychotherapy     Expected Disposition Plan: Substance treatment program        Julito Bradshaw, EDWINA-BC

## 2024-11-22 NOTE — PROGRESS NOTES
11/22/24 1400   [facility] Group Therapy   Group Name Therapeutic Recreation   Specific Interventions Skilled Activity Leisure Education and Awareness   Participation Level None   Participation Quality Lack of Interest;Conflict w/ Other;Services

## 2024-11-22 NOTE — PLAN OF CARE
Problem: Adult Behavioral Health Plan of Care  Goal: Plan of Care Review  Outcome: Met  Goal: Patient-Specific Goal (Individualization)  Outcome: Met  Goal: Adheres to Safety Considerations for Self and Others  Outcome: Met  Goal: Absence of New-Onset Illness or Injury  Outcome: Met  Goal: Optimized Coping Skills in Response to Life Stressors  Outcome: Met  Goal: Develops/Participates in Therapeutic [place] to Support Successful Transition  Outcome: Met  Goal: Rounds/Family Conference  Outcome: Met     Problem: Psychotic Signs/Symptoms  Goal: Improved Behavioral Control (Psychotic Signs/Symptoms)  Outcome: Met  Goal: Optimal Cognitive Function (Psychotic Signs/Symptoms)  Outcome: Met  Goal: Increased Participation and Engagement (Psychotic Signs/Symptoms)  Outcome: Met  Goal: Improved Mood Symptoms (Psychotic Signs/Symptoms)  Outcome: Met  Goal: Improved Psychomotor Symptoms (Psychotic Signs/Symptoms)  Outcome: Met  Goal: Decreased Sensory Symptoms (Psychotic Signs/Symptoms)  Outcome: Met  Goal: Improved Sleep (Psychotic Signs/Symptoms)  Outcome: Met  Goal: Enhanced Social, Occupational or Functional Skills (Psychotic Signs/Symptoms)  Outcome: Met     Problem: Excessive Substance Use  Goal: Optimized Energy Level (Excessive Substance Use)  Outcome: Met  Goal: Improved Behavioral Control (Excessive Substance Use)  Outcome: Met  Goal: Increased Participation and Engagement (Excessive Substance Use)  Outcome: Met  Goal: Improved Physiologic Symptoms (Excessive Substance Use)  Outcome: Met  Goal: Enhanced Social, Occupational or Functional Skills (Excessive Substance Use)  Outcome: Met     AAOx4 Pt denies SI/HI/AVH. Pt denies depression and anxiety, reports poor sleep and good appetite. Pt has good eye contact and affect is appropriate. q15 min safety checks continued. Pt focused on going home tomorrow.

## 2024-11-23 VITALS
WEIGHT: 240 LBS | SYSTOLIC BLOOD PRESSURE: 109 MMHG | HEIGHT: 72 IN | TEMPERATURE: 98 F | RESPIRATION RATE: 16 BRPM | DIASTOLIC BLOOD PRESSURE: 73 MMHG | OXYGEN SATURATION: 99 % | BODY MASS INDEX: 32.51 KG/M2 | HEART RATE: 67 BPM

## 2024-11-23 PROCEDURE — 25000003 PHARM REV CODE 250: Performed by: PSYCHIATRY & NEUROLOGY

## 2024-11-23 PROCEDURE — 25000003 PHARM REV CODE 250: Performed by: PEDIATRICS

## 2024-11-23 RX ADMIN — SERTRALINE HYDROCHLORIDE 100 MG: 50 TABLET ORAL at 08:11

## 2024-11-23 RX ADMIN — TAMSULOSIN HYDROCHLORIDE 0.4 MG: 0.4 CAPSULE ORAL at 08:11

## 2024-11-23 RX ADMIN — ARIPIPRAZOLE 10 MG: 5 TABLET ORAL at 08:11

## 2024-11-23 NOTE — NURSING
AAOx4 Pt denies SI/HI/AVH. Pt denies depression and anxiety, reports poor sleep and good appetite. Pt has good eye contact and affect is appropriate. q15 min safety checks continued. Pt focused on going to rehab tomorrow.

## 2024-11-23 NOTE — NURSING
2011 pt c/o anxiety and restlessness. Administered PRN atarax and trazodone at this time.     2200 pt noted to be resting quietly in bed with eyes closed. Respirations even/unlabored.